# Patient Record
Sex: FEMALE | Race: WHITE | Employment: FULL TIME | URBAN - METROPOLITAN AREA
[De-identification: names, ages, dates, MRNs, and addresses within clinical notes are randomized per-mention and may not be internally consistent; named-entity substitution may affect disease eponyms.]

---

## 2019-06-11 ENCOUNTER — HOSPITAL ENCOUNTER (OUTPATIENT)
Age: 54
Discharge: HOME | End: 2019-06-13
Payer: COMMERCIAL

## 2019-06-11 ENCOUNTER — HOSPITAL ENCOUNTER (OUTPATIENT)
Dept: GENERAL RADIOLOGY | Age: 54
Discharge: HOME | End: 2019-06-13
Payer: COMMERCIAL

## 2019-06-11 DIAGNOSIS — M25.531 PAIN IN RIGHT WRIST: ICD-10-CM

## 2019-06-11 DIAGNOSIS — M25.532 PAIN IN LEFT WRIST: ICD-10-CM

## 2019-06-11 PROCEDURE — 73110 X-RAY EXAM OF WRIST: CPT

## 2020-01-06 ENCOUNTER — HOSPITAL ENCOUNTER (EMERGENCY)
Age: 55
Discharge: HOME | End: 2020-01-06
Attending: EMERGENCY MEDICINE
Payer: COMMERCIAL

## 2020-01-06 ENCOUNTER — APPOINTMENT (OUTPATIENT)
Dept: CT IMAGING | Age: 55
End: 2020-01-06
Payer: COMMERCIAL

## 2020-01-06 VITALS
HEART RATE: 92 BPM | RESPIRATION RATE: 16 BRPM | OXYGEN SATURATION: 98 % | DIASTOLIC BLOOD PRESSURE: 65 MMHG | SYSTOLIC BLOOD PRESSURE: 134 MMHG | TEMPERATURE: 98.3 F

## 2020-01-06 LAB
ALBUMIN SERPL-MCNC: 4.2 G/DL (ref 3.5–5.2)
ALP BLD-CCNC: 190 U/L (ref 35–104)
ALT SERPL-CCNC: 94 U/L (ref 0–32)
ANION GAP SERPL CALCULATED.3IONS-SCNC: 18 MMOL/L (ref 7–16)
AST SERPL-CCNC: 57 U/L (ref 0–31)
BACTERIA: ABNORMAL /HPF
BILIRUB SERPL-MCNC: 0.8 MG/DL (ref 0–1.2)
BILIRUBIN URINE: ABNORMAL
BLOOD, URINE: ABNORMAL
BUN BLDV-MCNC: 12 MG/DL (ref 6–20)
CALCIUM SERPL-MCNC: 10.1 MG/DL (ref 8.6–10.2)
CHLORIDE BLD-SCNC: 96 MMOL/L (ref 98–107)
CLARITY: CLEAR
CO2: 20 MMOL/L (ref 22–29)
COLOR: YELLOW
CREAT SERPL-MCNC: 0.8 MG/DL (ref 0.5–1)
EPITHELIAL CELLS, UA: ABNORMAL /HPF
GFR AFRICAN AMERICAN: >60
GFR NON-AFRICAN AMERICAN: >60 ML/MIN/1.73
GLUCOSE BLD-MCNC: 113 MG/DL (ref 74–99)
GLUCOSE URINE: NEGATIVE MG/DL
HCT VFR BLD CALC: 43.2 % (ref 34–48)
HEMOGLOBIN: 14.3 G/DL (ref 11.5–15.5)
KETONES, URINE: ABNORMAL MG/DL
LACTIC ACID, SEPSIS: 3.2 MMOL/L (ref 0.5–1.9)
LEUKOCYTE ESTERASE, URINE: NEGATIVE
LIPASE: 25 U/L (ref 13–60)
MCH RBC QN AUTO: 28.4 PG (ref 26–35)
MCHC RBC AUTO-ENTMCNC: 33.1 % (ref 32–34.5)
MCV RBC AUTO: 85.9 FL (ref 80–99.9)
NITRITE, URINE: NEGATIVE
PDW BLD-RTO: 12.8 FL (ref 11.5–15)
PH UA: 6 (ref 5–9)
PLATELET # BLD: 221 E9/L (ref 130–450)
PMV BLD AUTO: 10.1 FL (ref 7–12)
POTASSIUM SERPL-SCNC: 3.3 MMOL/L (ref 3.5–5)
PROTEIN UA: 100 MG/DL
RBC # BLD: 5.03 E12/L (ref 3.5–5.5)
RBC UA: ABNORMAL /HPF (ref 0–2)
SODIUM BLD-SCNC: 134 MMOL/L (ref 132–146)
SPECIFIC GRAVITY UA: 1.02 (ref 1–1.03)
TOTAL PROTEIN: 8.2 G/DL (ref 6.4–8.3)
TROPONIN: <0.01 NG/ML (ref 0–0.03)
UROBILINOGEN, URINE: 4 E.U./DL
WBC # BLD: 7.1 E9/L (ref 4.5–11.5)
WBC UA: ABNORMAL /HPF (ref 0–5)

## 2020-01-06 PROCEDURE — 2580000003 HC RX 258: Performed by: EMERGENCY MEDICINE

## 2020-01-06 PROCEDURE — 80053 COMPREHEN METABOLIC PANEL: CPT

## 2020-01-06 PROCEDURE — 96372 THER/PROPH/DIAG INJ SC/IM: CPT

## 2020-01-06 PROCEDURE — 6370000000 HC RX 637 (ALT 250 FOR IP): Performed by: NURSE PRACTITIONER

## 2020-01-06 PROCEDURE — 74176 CT ABD & PELVIS W/O CONTRAST: CPT

## 2020-01-06 PROCEDURE — 93005 ELECTROCARDIOGRAM TRACING: CPT | Performed by: NURSE PRACTITIONER

## 2020-01-06 PROCEDURE — 6360000002 HC RX W HCPCS

## 2020-01-06 PROCEDURE — 83605 ASSAY OF LACTIC ACID: CPT

## 2020-01-06 PROCEDURE — 83690 ASSAY OF LIPASE: CPT

## 2020-01-06 PROCEDURE — 6360000002 HC RX W HCPCS: Performed by: EMERGENCY MEDICINE

## 2020-01-06 PROCEDURE — 85027 COMPLETE CBC AUTOMATED: CPT

## 2020-01-06 PROCEDURE — 99284 EMERGENCY DEPT VISIT MOD MDM: CPT

## 2020-01-06 PROCEDURE — 84484 ASSAY OF TROPONIN QUANT: CPT

## 2020-01-06 PROCEDURE — 81001 URINALYSIS AUTO W/SCOPE: CPT

## 2020-01-06 RX ORDER — ONDANSETRON 8 MG/1
8 TABLET, ORALLY DISINTEGRATING ORAL EVERY 8 HOURS PRN
Qty: 12 TABLET | Refills: 1 | Status: SHIPPED | OUTPATIENT
Start: 2020-01-06 | End: 2020-05-22

## 2020-01-06 RX ORDER — PROMETHAZINE HYDROCHLORIDE 25 MG/ML
25 INJECTION, SOLUTION INTRAMUSCULAR; INTRAVENOUS ONCE
Status: COMPLETED | OUTPATIENT
Start: 2020-01-06 | End: 2020-01-06

## 2020-01-06 RX ORDER — PROMETHAZINE HYDROCHLORIDE 25 MG/ML
INJECTION, SOLUTION INTRAMUSCULAR; INTRAVENOUS
Status: COMPLETED
Start: 2020-01-06 | End: 2020-01-06

## 2020-01-06 RX ORDER — ONDANSETRON 4 MG/1
4 TABLET, ORALLY DISINTEGRATING ORAL ONCE
Status: COMPLETED | OUTPATIENT
Start: 2020-01-06 | End: 2020-01-06

## 2020-01-06 RX ORDER — 0.9 % SODIUM CHLORIDE 0.9 %
1000 INTRAVENOUS SOLUTION INTRAVENOUS ONCE
Status: COMPLETED | OUTPATIENT
Start: 2020-01-06 | End: 2020-01-06

## 2020-01-06 RX ORDER — ONDANSETRON 2 MG/ML
4 INJECTION INTRAMUSCULAR; INTRAVENOUS EVERY 6 HOURS PRN
Status: DISCONTINUED | OUTPATIENT
Start: 2020-01-06 | End: 2020-01-06 | Stop reason: HOSPADM

## 2020-01-06 RX ADMIN — ONDANSETRON 4 MG: 4 TABLET, ORALLY DISINTEGRATING ORAL at 16:37

## 2020-01-06 RX ADMIN — SODIUM CHLORIDE 1000 ML: 9 INJECTION, SOLUTION INTRAVENOUS at 20:15

## 2020-01-06 RX ADMIN — PROMETHAZINE HYDROCHLORIDE 25 MG: 25 INJECTION INTRAMUSCULAR; INTRAVENOUS at 17:00

## 2020-01-06 RX ADMIN — ONDANSETRON 4 MG: 2 INJECTION INTRAMUSCULAR; INTRAVENOUS at 19:20

## 2020-01-06 RX ADMIN — SODIUM CHLORIDE 1000 ML: 9 INJECTION, SOLUTION INTRAVENOUS at 18:48

## 2020-01-06 RX ADMIN — PROMETHAZINE HYDROCHLORIDE 25 MG: 25 INJECTION, SOLUTION INTRAMUSCULAR; INTRAVENOUS at 17:00

## 2020-01-06 SDOH — HEALTH STABILITY: MENTAL HEALTH: HOW OFTEN DO YOU HAVE A DRINK CONTAINING ALCOHOL?: NEVER

## 2020-01-06 ASSESSMENT — PAIN SCALES - GENERAL: PAINLEVEL_OUTOF10: 7

## 2020-01-06 NOTE — ED NOTES
Bed: 09  Expected date:   Expected time:   Means of arrival:   Comments:  triage     Abner Humphries RN  01/06/20 1127

## 2020-01-06 NOTE — ED PROVIDER NOTES
HPI:  1/6/20, Time: 6:33 PM         Veronica Lozano is a 53 y.o. female presenting to the ED for nausea vomiting diarrhea body aches chills, beginning 3 days ago.  had similar symptoms last week however not as severe. She is unable to keep anything down. Chest pain slight cough. no documented fever but has had shaking chills last few days. Review of Systems:   Pertinent positives and negatives are stated within HPI, all other systems reviewed and are negative.          --------------------------------------------- PAST HISTORY ---------------------------------------------  Past Medical History:  has no past medical history on file. Past Surgical History:  has no past surgical history on file. Social History:  reports that she has quit smoking. She smoked 0.50 packs per day. She does not have any smokeless tobacco history on file. She reports that she does not drink alcohol or use drugs. Family History: family history is not on file. The patients home medications have been reviewed.     Allergies: Pcn [penicillins]    -------------------------------------------------- RESULTS -------------------------------------------------  All laboratory and radiology results have been personally reviewed by myself   LABS:  Results for orders placed or performed during the hospital encounter of 01/06/20   CBC   Result Value Ref Range    WBC 7.1 4.5 - 11.5 E9/L    RBC 5.03 3.50 - 5.50 E12/L    Hemoglobin 14.3 11.5 - 15.5 g/dL    Hematocrit 43.2 34.0 - 48.0 %    MCV 85.9 80.0 - 99.9 fL    MCH 28.4 26.0 - 35.0 pg    MCHC 33.1 32.0 - 34.5 %    RDW 12.8 11.5 - 15.0 fL    Platelets 742 191 - 163 E9/L    MPV 10.1 7.0 - 12.0 fL   Comprehensive Metabolic Panel   Result Value Ref Range    Sodium 134 132 - 146 mmol/L    Potassium 3.3 (L) 3.5 - 5.0 mmol/L    Chloride 96 (L) 98 - 107 mmol/L    CO2 20 (L) 22 - 29 mmol/L    Anion Gap 18 (H) 7 - 16 mmol/L    Glucose 113 (H) 74 - 99 mg/dL    BUN 12 6 - 20 mg/dL Lungs clear to auscultation bilaterally, no wheezes, rales, or rhonchi. Not in respiratory distress  Cardiovascular:  Regular rate and rhythm, no murmurs, gallops, or rubs. Abdomen: Soft, non tender, non distended,   Extremities: Moves all extremities x 4. Warm and well perfused  Skin: warm and dry without rash  Neurologic: GCS 15, nonfocal  Psych: Normal Affect      ------------------------------ ED COURSE/MEDICAL DECISION MAKING----------------------  Medications   0.9 % sodium chloride bolus (1,000 mLs Intravenous New Bag 1/6/20 2015)   ondansetron (ZOFRAN) injection 4 mg (4 mg Intravenous Given 1/6/20 1920)   ondansetron (ZOFRAN-ODT) disintegrating tablet 4 mg (4 mg Oral Given 1/6/20 1637)   promethazine (PHENERGAN) injection 25 mg (25 mg Intramuscular Given 1/6/20 1700)   0.9 % sodium chloride bolus (0 mLs Intravenous Stopped 1/6/20 2015)         ED COURSE:       Medical Decision Making:    Nausea vomiting unable to tolerate p.o. appears dehydrated and is soft and this is likely a viral syndrome will give IV fluids and reassess    Reassessment: Tolerating p.o. soft nontender send home with Zofran discussed indications to return    Counseling: The emergency provider has spoken with the patient and discussed todays results, in addition to providing specific details for the plan of care and counseling regarding the diagnosis and prognosis. Questions are answered at this time and they are agreeable with the plan.      --------------------------------- IMPRESSION AND DISPOSITION ---------------------------------    IMPRESSION  1. Gastroenteritis    2. Viral syndrome        DISPOSITION  Disposition: Discharge to home  Patient condition is stable      NOTE: This report was transcribed using voice recognition software.  Every effort was made to ensure accuracy; however, inadvertent computerized transcription errors may be present      Adolfo Dupont MD  01/06/20 0140

## 2020-01-07 LAB
EKG ATRIAL RATE: 84 BPM
EKG P AXIS: 71 DEGREES
EKG P-R INTERVAL: 156 MS
EKG Q-T INTERVAL: 344 MS
EKG QRS DURATION: 90 MS
EKG QTC CALCULATION (BAZETT): 406 MS
EKG R AXIS: 36 DEGREES
EKG T AXIS: 82 DEGREES
EKG VENTRICULAR RATE: 84 BPM

## 2020-01-07 PROCEDURE — 93010 ELECTROCARDIOGRAM REPORT: CPT | Performed by: INTERNAL MEDICINE

## 2020-01-13 ENCOUNTER — ANESTHESIA EVENT (OUTPATIENT)
Dept: OPERATING ROOM | Age: 55
DRG: 377 | End: 2020-01-13
Payer: COMMERCIAL

## 2020-01-13 ENCOUNTER — APPOINTMENT (OUTPATIENT)
Dept: CT IMAGING | Age: 55
DRG: 377 | End: 2020-01-13
Payer: COMMERCIAL

## 2020-01-13 ENCOUNTER — ANESTHESIA (OUTPATIENT)
Dept: OPERATING ROOM | Age: 55
DRG: 377 | End: 2020-01-13
Payer: COMMERCIAL

## 2020-01-13 ENCOUNTER — APPOINTMENT (OUTPATIENT)
Dept: ULTRASOUND IMAGING | Age: 55
DRG: 377 | End: 2020-01-13
Payer: COMMERCIAL

## 2020-01-13 ENCOUNTER — HOSPITAL ENCOUNTER (INPATIENT)
Age: 55
LOS: 4 days | Discharge: HOME | DRG: 377 | End: 2020-01-17
Attending: EMERGENCY MEDICINE | Admitting: SURGERY
Payer: COMMERCIAL

## 2020-01-13 ENCOUNTER — APPOINTMENT (OUTPATIENT)
Dept: GENERAL RADIOLOGY | Age: 55
DRG: 377 | End: 2020-01-13
Payer: COMMERCIAL

## 2020-01-13 VITALS — OXYGEN SATURATION: 100 % | SYSTOLIC BLOOD PRESSURE: 129 MMHG | DIASTOLIC BLOOD PRESSURE: 64 MMHG

## 2020-01-13 PROBLEM — K92.0 HEMATEMESIS: Status: ACTIVE | Noted: 2020-01-13

## 2020-01-13 LAB
ABO/RH: NORMAL
ACETAMINOPHEN LEVEL: <5 MCG/ML (ref 10–30)
ALBUMIN SERPL-MCNC: 2.6 G/DL (ref 3.5–5.2)
ALP BLD-CCNC: 617 U/L (ref 35–104)
ALT SERPL-CCNC: 269 U/L (ref 0–32)
AMMONIA: 28 UMOL/L (ref 11–51)
ANION GAP SERPL CALCULATED.3IONS-SCNC: 15 MMOL/L (ref 7–16)
ANISOCYTOSIS: ABNORMAL
ANTIBODY SCREEN: NORMAL
APTT: 27.6 SEC (ref 24.5–35.1)
AST SERPL-CCNC: 259 U/L (ref 0–31)
BASOPHILS ABSOLUTE: 0.04 E9/L (ref 0–0.2)
BASOPHILS RELATIVE PERCENT: 0.6 % (ref 0–2)
BILIRUB SERPL-MCNC: 3.9 MG/DL (ref 0–1.2)
BUN BLDV-MCNC: 22 MG/DL (ref 6–20)
CALCIUM SERPL-MCNC: 8.5 MG/DL (ref 8.6–10.2)
CHLORIDE BLD-SCNC: 102 MMOL/L (ref 98–107)
CHP ED QC CHECK: NORMAL
CO2: 21 MMOL/L (ref 22–29)
CREAT SERPL-MCNC: 0.7 MG/DL (ref 0.5–1)
EOSINOPHILS ABSOLUTE: 0.01 E9/L (ref 0.05–0.5)
EOSINOPHILS RELATIVE PERCENT: 0.1 % (ref 0–6)
ETHANOL: <10 MG/DL (ref 0–0.08)
GFR AFRICAN AMERICAN: >60
GFR NON-AFRICAN AMERICAN: >60 ML/MIN/1.73
GLUCOSE BLD-MCNC: 112 MG/DL (ref 74–99)
GLUCOSE BLD-MCNC: 118 MG/DL
HCT VFR BLD CALC: 29 % (ref 34–48)
HEMOGLOBIN: 9.5 G/DL (ref 11.5–15.5)
IMMATURE GRANULOCYTES #: 0.31 E9/L
IMMATURE GRANULOCYTES %: 4.3 % (ref 0–5)
INR BLD: 1.2
LACTIC ACID: 3.8 MMOL/L (ref 0.5–2.2)
LYMPHOCYTES ABSOLUTE: 2.55 E9/L (ref 1.5–4)
LYMPHOCYTES RELATIVE PERCENT: 35.2 % (ref 20–42)
MCH RBC QN AUTO: 28.8 PG (ref 26–35)
MCHC RBC AUTO-ENTMCNC: 32.8 % (ref 32–34.5)
MCV RBC AUTO: 87.9 FL (ref 80–99.9)
METER GLUCOSE: 118 MG/DL (ref 74–99)
MONOCYTES ABSOLUTE: 0.36 E9/L (ref 0.1–0.95)
MONOCYTES RELATIVE PERCENT: 5 % (ref 2–12)
NEUTROPHILS ABSOLUTE: 3.97 E9/L (ref 1.8–7.3)
NEUTROPHILS RELATIVE PERCENT: 54.8 % (ref 43–80)
PDW BLD-RTO: 13.9 FL (ref 11.5–15)
PLATELET # BLD: 224 E9/L (ref 130–450)
PMV BLD AUTO: 10.3 FL (ref 7–12)
POTASSIUM SERPL-SCNC: 4.8 MMOL/L (ref 3.5–5)
PROTHROMBIN TIME: 13.6 SEC (ref 9.3–12.4)
RBC # BLD: 3.3 E12/L (ref 3.5–5.5)
SALICYLATE, SERUM: <0.3 MG/DL (ref 0–30)
SODIUM BLD-SCNC: 138 MMOL/L (ref 132–146)
TOTAL PROTEIN: 5.9 G/DL (ref 6.4–8.3)
TRICYCLIC ANTIDEPRESSANTS SCREEN SERUM: NEGATIVE NG/ML
TROPONIN: <0.01 NG/ML (ref 0–0.03)
WBC # BLD: 7.2 E9/L (ref 4.5–11.5)

## 2020-01-13 PROCEDURE — 85730 THROMBOPLASTIN TIME PARTIAL: CPT

## 2020-01-13 PROCEDURE — 86900 BLOOD TYPING SEROLOGIC ABO: CPT

## 2020-01-13 PROCEDURE — 2580000003 HC RX 258: Performed by: EMERGENCY MEDICINE

## 2020-01-13 PROCEDURE — 6360000002 HC RX W HCPCS: Performed by: STUDENT IN AN ORGANIZED HEALTH CARE EDUCATION/TRAINING PROGRAM

## 2020-01-13 PROCEDURE — 70450 CT HEAD/BRAIN W/O DYE: CPT

## 2020-01-13 PROCEDURE — 86923 COMPATIBILITY TEST ELECTRIC: CPT

## 2020-01-13 PROCEDURE — 36430 TRANSFUSION BLD/BLD COMPNT: CPT

## 2020-01-13 PROCEDURE — 82140 ASSAY OF AMMONIA: CPT

## 2020-01-13 PROCEDURE — 43255 EGD CONTROL BLEEDING ANY: CPT | Performed by: SURGERY

## 2020-01-13 PROCEDURE — 86850 RBC ANTIBODY SCREEN: CPT

## 2020-01-13 PROCEDURE — 86901 BLOOD TYPING SEROLOGIC RH(D): CPT

## 2020-01-13 PROCEDURE — 3600000011 HC SURGERY LEVEL 1  ADDTL 15MIN: Performed by: SURGERY

## 2020-01-13 PROCEDURE — 85610 PROTHROMBIN TIME: CPT

## 2020-01-13 PROCEDURE — 96372 THER/PROPH/DIAG INJ SC/IM: CPT

## 2020-01-13 PROCEDURE — 83605 ASSAY OF LACTIC ACID: CPT

## 2020-01-13 PROCEDURE — C9113 INJ PANTOPRAZOLE SODIUM, VIA: HCPCS | Performed by: STUDENT IN AN ORGANIZED HEALTH CARE EDUCATION/TRAINING PROGRAM

## 2020-01-13 PROCEDURE — 73030 X-RAY EXAM OF SHOULDER: CPT

## 2020-01-13 PROCEDURE — 80074 ACUTE HEPATITIS PANEL: CPT

## 2020-01-13 PROCEDURE — 87040 BLOOD CULTURE FOR BACTERIA: CPT

## 2020-01-13 PROCEDURE — 71045 X-RAY EXAM CHEST 1 VIEW: CPT

## 2020-01-13 PROCEDURE — 99291 CRITICAL CARE FIRST HOUR: CPT | Performed by: SURGERY

## 2020-01-13 PROCEDURE — 87081 CULTURE SCREEN ONLY: CPT

## 2020-01-13 PROCEDURE — 3600000001 HC SURGERY LEVEL 1  BASE: Performed by: SURGERY

## 2020-01-13 PROCEDURE — 6360000002 HC RX W HCPCS: Performed by: NURSE ANESTHETIST, CERTIFIED REGISTERED

## 2020-01-13 PROCEDURE — 3700000000 HC ANESTHESIA ATTENDED CARE: Performed by: SURGERY

## 2020-01-13 PROCEDURE — 3700000001 HC ADD 15 MINUTES (ANESTHESIA): Performed by: SURGERY

## 2020-01-13 PROCEDURE — 84484 ASSAY OF TROPONIN QUANT: CPT

## 2020-01-13 PROCEDURE — 2000000000 HC ICU R&B

## 2020-01-13 PROCEDURE — 93005 ELECTROCARDIOGRAM TRACING: CPT | Performed by: EMERGENCY MEDICINE

## 2020-01-13 PROCEDURE — 3E0G8GC INTRODUCTION OF OTHER THERAPEUTIC SUBSTANCE INTO UPPER GI, VIA NATURAL OR ARTIFICIAL OPENING ENDOSCOPIC: ICD-10-PCS | Performed by: SURGERY

## 2020-01-13 PROCEDURE — 82962 GLUCOSE BLOOD TEST: CPT

## 2020-01-13 PROCEDURE — 76705 ECHO EXAM OF ABDOMEN: CPT

## 2020-01-13 PROCEDURE — 2500000003 HC RX 250 WO HCPCS: Performed by: NURSE ANESTHETIST, CERTIFIED REGISTERED

## 2020-01-13 PROCEDURE — 86920 COMPATIBILITY TEST SPIN: CPT

## 2020-01-13 PROCEDURE — 96374 THER/PROPH/DIAG INJ IV PUSH: CPT

## 2020-01-13 PROCEDURE — 0W3P8ZZ CONTROL BLEEDING IN GASTROINTESTINAL TRACT, VIA NATURAL OR ARTIFICIAL OPENING ENDOSCOPIC: ICD-10-PCS | Performed by: SURGERY

## 2020-01-13 PROCEDURE — 85025 COMPLETE CBC W/AUTO DIFF WBC: CPT

## 2020-01-13 PROCEDURE — 74175 CTA ABDOMEN W/CONTRAST: CPT

## 2020-01-13 PROCEDURE — 6360000002 HC RX W HCPCS: Performed by: EMERGENCY MEDICINE

## 2020-01-13 PROCEDURE — 6360000004 HC RX CONTRAST MEDICATION: Performed by: RADIOLOGY

## 2020-01-13 PROCEDURE — C9113 INJ PANTOPRAZOLE SODIUM, VIA: HCPCS | Performed by: EMERGENCY MEDICINE

## 2020-01-13 PROCEDURE — G0480 DRUG TEST DEF 1-7 CLASSES: HCPCS

## 2020-01-13 PROCEDURE — P9016 RBC LEUKOCYTES REDUCED: HCPCS

## 2020-01-13 PROCEDURE — 99291 CRITICAL CARE FIRST HOUR: CPT

## 2020-01-13 PROCEDURE — 80053 COMPREHEN METABOLIC PANEL: CPT

## 2020-01-13 PROCEDURE — 36415 COLL VENOUS BLD VENIPUNCTURE: CPT

## 2020-01-13 PROCEDURE — 2709999900 HC NON-CHARGEABLE SUPPLY: Performed by: SURGERY

## 2020-01-13 PROCEDURE — 2720000010 HC SURG SUPPLY STERILE: Performed by: SURGERY

## 2020-01-13 PROCEDURE — 2580000003 HC RX 258: Performed by: STUDENT IN AN ORGANIZED HEALTH CARE EDUCATION/TRAINING PROGRAM

## 2020-01-13 PROCEDURE — 80307 DRUG TEST PRSMV CHEM ANLYZR: CPT

## 2020-01-13 RX ORDER — 0.9 % SODIUM CHLORIDE 0.9 %
250 INTRAVENOUS SOLUTION INTRAVENOUS ONCE
Status: DISCONTINUED | OUTPATIENT
Start: 2020-01-13 | End: 2020-01-13

## 2020-01-13 RX ORDER — ONDANSETRON 2 MG/ML
4 INJECTION INTRAMUSCULAR; INTRAVENOUS EVERY 6 HOURS PRN
Status: DISCONTINUED | OUTPATIENT
Start: 2020-01-13 | End: 2020-01-17 | Stop reason: HOSPADM

## 2020-01-13 RX ORDER — SUCCINYLCHOLINE/SOD CL,ISO/PF 200MG/10ML
SYRINGE (ML) INTRAVENOUS PRN
Status: DISCONTINUED | OUTPATIENT
Start: 2020-01-13 | End: 2020-01-13

## 2020-01-13 RX ORDER — 0.9 % SODIUM CHLORIDE 0.9 %
1000 INTRAVENOUS SOLUTION INTRAVENOUS ONCE
Status: DISCONTINUED | OUTPATIENT
Start: 2020-01-13 | End: 2020-01-15

## 2020-01-13 RX ORDER — PHENYLEPHRINE HYDROCHLORIDE 10 MG/ML
INJECTION INTRAVENOUS PRN
Status: DISCONTINUED | OUTPATIENT
Start: 2020-01-13 | End: 2020-01-13 | Stop reason: SDUPTHER

## 2020-01-13 RX ORDER — SODIUM CHLORIDE, SODIUM LACTATE, POTASSIUM CHLORIDE, CALCIUM CHLORIDE 600; 310; 30; 20 MG/100ML; MG/100ML; MG/100ML; MG/100ML
INJECTION, SOLUTION INTRAVENOUS CONTINUOUS
Status: DISCONTINUED | OUTPATIENT
Start: 2020-01-14 | End: 2020-01-15

## 2020-01-13 RX ORDER — PANTOPRAZOLE SODIUM 40 MG/10ML
80 INJECTION, POWDER, LYOPHILIZED, FOR SOLUTION INTRAVENOUS ONCE
Status: COMPLETED | OUTPATIENT
Start: 2020-01-13 | End: 2020-01-13

## 2020-01-13 RX ORDER — ONDANSETRON 2 MG/ML
INJECTION INTRAMUSCULAR; INTRAVENOUS PRN
Status: DISCONTINUED | OUTPATIENT
Start: 2020-01-13 | End: 2020-01-13 | Stop reason: SDUPTHER

## 2020-01-13 RX ORDER — PROPOFOL 10 MG/ML
INJECTION, EMULSION INTRAVENOUS PRN
Status: DISCONTINUED | OUTPATIENT
Start: 2020-01-13 | End: 2020-01-13 | Stop reason: SDUPTHER

## 2020-01-13 RX ORDER — SODIUM CHLORIDE 9 MG/ML
INJECTION, SOLUTION INTRAVENOUS CONTINUOUS
Status: DISCONTINUED | OUTPATIENT
Start: 2020-01-13 | End: 2020-01-15

## 2020-01-13 RX ORDER — SUCCINYLCHOLINE/SOD CL,ISO/PF 200MG/10ML
SYRINGE (ML) INTRAVENOUS PRN
Status: DISCONTINUED | OUTPATIENT
Start: 2020-01-13 | End: 2020-01-13 | Stop reason: SDUPTHER

## 2020-01-13 RX ORDER — DEXAMETHASONE SODIUM PHOSPHATE 10 MG/ML
INJECTION INTRAMUSCULAR; INTRAVENOUS PRN
Status: DISCONTINUED | OUTPATIENT
Start: 2020-01-13 | End: 2020-01-13 | Stop reason: SDUPTHER

## 2020-01-13 RX ORDER — LIDOCAINE HYDROCHLORIDE 20 MG/ML
INJECTION, SOLUTION INTRAVENOUS PRN
Status: DISCONTINUED | OUTPATIENT
Start: 2020-01-13 | End: 2020-01-13 | Stop reason: SDUPTHER

## 2020-01-13 RX ORDER — SODIUM CHLORIDE 0.9 % (FLUSH) 0.9 %
10 SYRINGE (ML) INJECTION PRN
Status: DISCONTINUED | OUTPATIENT
Start: 2020-01-13 | End: 2020-01-17 | Stop reason: HOSPADM

## 2020-01-13 RX ORDER — FENTANYL CITRATE 50 UG/ML
INJECTION, SOLUTION INTRAMUSCULAR; INTRAVENOUS PRN
Status: DISCONTINUED | OUTPATIENT
Start: 2020-01-13 | End: 2020-01-13 | Stop reason: SDUPTHER

## 2020-01-13 RX ORDER — 0.9 % SODIUM CHLORIDE 0.9 %
1000 INTRAVENOUS SOLUTION INTRAVENOUS ONCE
Status: COMPLETED | OUTPATIENT
Start: 2020-01-13 | End: 2020-01-14

## 2020-01-13 RX ORDER — MIDAZOLAM HYDROCHLORIDE 1 MG/ML
INJECTION INTRAMUSCULAR; INTRAVENOUS PRN
Status: DISCONTINUED | OUTPATIENT
Start: 2020-01-13 | End: 2020-01-13 | Stop reason: SDUPTHER

## 2020-01-13 RX ORDER — SODIUM CHLORIDE 0.9 % (FLUSH) 0.9 %
10 SYRINGE (ML) INJECTION EVERY 12 HOURS SCHEDULED
Status: DISCONTINUED | OUTPATIENT
Start: 2020-01-14 | End: 2020-01-17 | Stop reason: HOSPADM

## 2020-01-13 RX ORDER — MORPHINE SULFATE 2 MG/ML
2 INJECTION, SOLUTION INTRAMUSCULAR; INTRAVENOUS
Status: DISCONTINUED | OUTPATIENT
Start: 2020-01-13 | End: 2020-01-15

## 2020-01-13 RX ADMIN — SODIUM CHLORIDE: 9 INJECTION, SOLUTION INTRAVENOUS at 22:30

## 2020-01-13 RX ADMIN — LIDOCAINE HYDROCHLORIDE 100 MG: 20 INJECTION, SOLUTION INTRAVENOUS at 22:40

## 2020-01-13 RX ADMIN — FENTANYL CITRATE 100 MCG: 50 INJECTION, SOLUTION INTRAMUSCULAR; INTRAVENOUS at 22:40

## 2020-01-13 RX ADMIN — PHENYLEPHRINE HYDROCHLORIDE 200 MCG: 10 INJECTION INTRAVENOUS at 22:40

## 2020-01-13 RX ADMIN — SODIUM CHLORIDE: 9 INJECTION, SOLUTION INTRAVENOUS at 23:10

## 2020-01-13 RX ADMIN — MIDAZOLAM 2 MG: 1 INJECTION INTRAMUSCULAR; INTRAVENOUS at 22:37

## 2020-01-13 RX ADMIN — PROPOFOL 130 MG: 10 INJECTION, EMULSION INTRAVENOUS at 22:40

## 2020-01-13 RX ADMIN — DEXAMETHASONE SODIUM PHOSPHATE 10 MG: 10 INJECTION INTRAMUSCULAR; INTRAVENOUS at 22:43

## 2020-01-13 RX ADMIN — SODIUM CHLORIDE 1000 ML: 9 INJECTION, SOLUTION INTRAVENOUS at 19:46

## 2020-01-13 RX ADMIN — PHENYLEPHRINE HYDROCHLORIDE 100 MCG: 10 INJECTION INTRAVENOUS at 22:44

## 2020-01-13 RX ADMIN — PHENYLEPHRINE HYDROCHLORIDE 200 MCG: 10 INJECTION INTRAVENOUS at 22:49

## 2020-01-13 RX ADMIN — PANTOPRAZOLE SODIUM 8 MG/HR: 40 INJECTION, POWDER, FOR SOLUTION INTRAVENOUS at 21:37

## 2020-01-13 RX ADMIN — TRIMETHOBENZAMIDE HYDROCHLORIDE 200 MG: 100 INJECTION INTRAMUSCULAR at 20:14

## 2020-01-13 RX ADMIN — IOPAMIDOL 100 ML: 755 INJECTION, SOLUTION INTRAVENOUS at 20:26

## 2020-01-13 RX ADMIN — ONDANSETRON HYDROCHLORIDE 4 MG: 2 INJECTION, SOLUTION INTRAMUSCULAR; INTRAVENOUS at 23:07

## 2020-01-13 RX ADMIN — Medication 100 MG: at 22:40

## 2020-01-13 RX ADMIN — PHENYLEPHRINE HYDROCHLORIDE 200 MCG: 10 INJECTION INTRAVENOUS at 22:54

## 2020-01-13 RX ADMIN — PANTOPRAZOLE SODIUM 80 MG: 40 INJECTION, POWDER, FOR SOLUTION INTRAVENOUS at 19:31

## 2020-01-13 ASSESSMENT — PULMONARY FUNCTION TESTS
PIF_VALUE: 1
PIF_VALUE: 28
PIF_VALUE: 26
PIF_VALUE: 2
PIF_VALUE: 30
PIF_VALUE: 13
PIF_VALUE: 36
PIF_VALUE: 0
PIF_VALUE: 4
PIF_VALUE: 1
PIF_VALUE: 23
PIF_VALUE: 23
PIF_VALUE: 19
PIF_VALUE: 1
PIF_VALUE: 33
PIF_VALUE: 26
PIF_VALUE: 23
PIF_VALUE: 26
PIF_VALUE: 32
PIF_VALUE: 25
PIF_VALUE: 32
PIF_VALUE: 28
PIF_VALUE: 25
PIF_VALUE: 27
PIF_VALUE: 18
PIF_VALUE: 11
PIF_VALUE: 1
PIF_VALUE: 1
PIF_VALUE: 4
PIF_VALUE: 0
PIF_VALUE: 0
PIF_VALUE: 27
PIF_VALUE: 21
PIF_VALUE: 25
PIF_VALUE: 18
PIF_VALUE: 28
PIF_VALUE: 25
PIF_VALUE: 25
PIF_VALUE: 26
PIF_VALUE: 27

## 2020-01-13 ASSESSMENT — PAIN SCALES - GENERAL: PAINLEVEL_OUTOF10: 8

## 2020-01-13 ASSESSMENT — PAIN DESCRIPTION - PAIN TYPE: TYPE: ACUTE PAIN

## 2020-01-13 ASSESSMENT — PAIN DESCRIPTION - LOCATION: LOCATION: ABDOMEN

## 2020-01-13 ASSESSMENT — LIFESTYLE VARIABLES: SMOKING_STATUS: 0

## 2020-01-13 NOTE — ED PROVIDER NOTES
uvular edema  Neck: Supple, full ROM, no stridor, no meningeal signs  Respiratory: Lungs clear to auscultation bilaterally, no wheezes, rales, or rhonchi. Not in respiratory distress  Cardiovascular:  Regular rate. Regular rhythm. 2+ distal pulses. Equal extremity pulses. Chest: No chest wall tenderness  GI:  Abdomen Soft, Non tender, Non distended. No rebound, guarding, or rigidity. No pulsatile masses. Musculoskeletal: Moves all extremities x 4. Warm and well perfused, no clubbing, cyanosis, or edema. Capillary refill <3 seconds  Integument: skin warm and dry. No rashes. Neurologic: GCS 15, no focal deficits, symmetric strength 5/5 in the upper and lower extremities bilaterally  Psychiatric: Normal Affect          -------------------------------------------------- RESULTS -------------------------------------------------  I have personally reviewed all laboratory and imaging results for this patient. Results are listed below.      LABS: (Lab results interpreted by me)  Results for orders placed or performed during the hospital encounter of 01/13/20   Comprehensive Metabolic Panel   Result Value Ref Range    Sodium 138 132 - 146 mmol/L    Potassium 4.8 3.5 - 5.0 mmol/L    Chloride 102 98 - 107 mmol/L    CO2 21 (L) 22 - 29 mmol/L    Anion Gap 15 7 - 16 mmol/L    Glucose 112 (H) 74 - 99 mg/dL    BUN 22 (H) 6 - 20 mg/dL    CREATININE 0.7 0.5 - 1.0 mg/dL    GFR Non-African American >60 >=60 mL/min/1.73    GFR African American >60     Calcium 8.5 (L) 8.6 - 10.2 mg/dL    Total Protein 5.9 (L) 6.4 - 8.3 g/dL    Alb 2.6 (L) 3.5 - 5.2 g/dL    Total Bilirubin 3.9 (H) 0.0 - 1.2 mg/dL    Alkaline Phosphatase 617 (H) 35 - 104 U/L     (H) 0 - 32 U/L     (H) 0 - 31 U/L   CBC Auto Differential   Result Value Ref Range    WBC 7.2 4.5 - 11.5 E9/L    RBC 3.30 (L) 3.50 - 5.50 E12/L    Hemoglobin 9.5 (L) 11.5 - 15.5 g/dL    Hematocrit 29.0 (L) 34.0 - 48.0 %    MCV 87.9 80.0 - 99.9 fL    MCH 28.8 26.0 - 35.0 pg Radiologist unless otherwise specified  XR SHOULDER LEFT (MIN 2 VIEWS)   Final Result      No evidence of fracture or dislocation of the shoulder. CT Head WO Contrast   Final Result      No evidence for acute intracranial process. CT of the brain is grossly unremarkable. CTA TRIPHASIC LIVER   Final Result      Splenomegaly with a 2.5 x 1.1 cm hypoenhancing mass within the   periphery of the spleen which might represent a prior laceration or   mass lesion. Punctate calyceal nonobstructing calcifications within the kidneys. XR CHEST PORTABLE   Final Result      No airspace opacities or pleural effusion. XR SHOULDER LEFT (MIN 2 VIEWS)    (Results Pending)         EKG Interpretation  Interpreted by emergency department physician, Dr. Elizabeth Levine   Sinus rhythm, rate 104, no STEMI        ------------------------- NURSING NOTES AND VITALS REVIEWED ---------------------------   The nursing notes within the ED encounter and vital signs as below have been reviewed by myself  BP 97/63   Pulse 104   Temp 97.9 °F (36.6 °C) (Oral)   Resp 21   Ht 5' 2\" (1.575 m)   Wt 130 lb (59 kg)   SpO2 100%   BMI 23.78 kg/m²     Oxygen Saturation Interpretation: Normal    The patients available past medical records and past encounters were reviewed.         ------------------------------ ED COURSE/MEDICAL DECISION MAKING----------------------  Medications   0.9 % sodium chloride infusion (has no administration in time range)   0.9 % sodium chloride bolus (has no administration in time range)   0.9 % sodium chloride bolus (has no administration in time range)   0.9 % sodium chloride bolus (has no administration in time range)   pantoprazole (PROTONIX) 80 mg in sodium chloride 0.9 % 100 mL infusion (8 mg/hr Intravenous New Bag 1/13/20 3237)   0.9 % sodium chloride bolus (1,000 mLs Intravenous New Bag 1/13/20 8756)   trimethobenzamide (TIGAN) injection 200 mg (200 mg

## 2020-01-13 NOTE — ED NOTES
Bed: 23  Expected date:   Expected time:   Means of arrival:   Comments:  ems     Eli Quarles RN  01/13/20 9607

## 2020-01-14 ENCOUNTER — APPOINTMENT (OUTPATIENT)
Dept: GENERAL RADIOLOGY | Age: 55
DRG: 377 | End: 2020-01-14
Payer: COMMERCIAL

## 2020-01-14 LAB
ALBUMIN SERPL-MCNC: 2.4 G/DL (ref 3.5–5.2)
ALP BLD-CCNC: 490 U/L (ref 35–104)
ALT SERPL-CCNC: 218 U/L (ref 0–32)
ANION GAP SERPL CALCULATED.3IONS-SCNC: 14 MMOL/L (ref 7–16)
AST SERPL-CCNC: 191 U/L (ref 0–31)
BILIRUB SERPL-MCNC: 2.5 MG/DL (ref 0–1.2)
BILIRUBIN DIRECT: 2 MG/DL (ref 0–0.3)
BUN BLDV-MCNC: 28 MG/DL (ref 6–20)
CALCIUM IONIZED: 1.03 MMOL/L (ref 1.15–1.33)
CALCIUM SERPL-MCNC: 7.5 MG/DL (ref 8.6–10.2)
CHLORIDE BLD-SCNC: 104 MMOL/L (ref 98–107)
CO2: 20 MMOL/L (ref 22–29)
CREAT SERPL-MCNC: 0.7 MG/DL (ref 0.5–1)
EKG ATRIAL RATE: 104 BPM
EKG P AXIS: 64 DEGREES
EKG P-R INTERVAL: 138 MS
EKG Q-T INTERVAL: 350 MS
EKG QRS DURATION: 88 MS
EKG QTC CALCULATION (BAZETT): 460 MS
EKG R AXIS: 55 DEGREES
EKG T AXIS: 2 DEGREES
EKG VENTRICULAR RATE: 104 BPM
GFR AFRICAN AMERICAN: >60
GFR NON-AFRICAN AMERICAN: >60 ML/MIN/1.73
GLUCOSE BLD-MCNC: 136 MG/DL (ref 74–99)
HAV IGM SER IA-ACNC: NORMAL
HCT VFR BLD CALC: 18.2 % (ref 34–48)
HCT VFR BLD CALC: 20.7 % (ref 34–48)
HCT VFR BLD CALC: 22.2 % (ref 34–48)
HEMOGLOBIN: 6.2 G/DL (ref 11.5–15.5)
HEMOGLOBIN: 6.9 G/DL (ref 11.5–15.5)
HEMOGLOBIN: 7.5 G/DL (ref 11.5–15.5)
HEPATITIS B CORE IGM ANTIBODY: NORMAL
HEPATITIS B SURFACE ANTIGEN INTERPRETATION: NORMAL
HEPATITIS C ANTIBODY INTERPRETATION: NORMAL
LACTIC ACID: 1.3 MMOL/L (ref 0.5–2.2)
LACTIC ACID: 1.9 MMOL/L (ref 0.5–2.2)
MAGNESIUM: 2 MG/DL (ref 1.6–2.6)
MCH RBC QN AUTO: 28.5 PG (ref 26–35)
MCH RBC QN AUTO: 28.8 PG (ref 26–35)
MCH RBC QN AUTO: 28.9 PG (ref 26–35)
MCHC RBC AUTO-ENTMCNC: 33.3 % (ref 32–34.5)
MCHC RBC AUTO-ENTMCNC: 33.8 % (ref 32–34.5)
MCHC RBC AUTO-ENTMCNC: 34.1 % (ref 32–34.5)
MCV RBC AUTO: 84.4 FL (ref 80–99.9)
MCV RBC AUTO: 84.7 FL (ref 80–99.9)
MCV RBC AUTO: 86.6 FL (ref 80–99.9)
PDW BLD-RTO: 13.8 FL (ref 11.5–15)
PDW BLD-RTO: 13.9 FL (ref 11.5–15)
PDW BLD-RTO: 14.1 FL (ref 11.5–15)
PHOSPHORUS: 3.3 MG/DL (ref 2.5–4.5)
PLATELET # BLD: 198 E9/L (ref 130–450)
PLATELET # BLD: 214 E9/L (ref 130–450)
PLATELET # BLD: 232 E9/L (ref 130–450)
PMV BLD AUTO: 10.1 FL (ref 7–12)
PMV BLD AUTO: 10.3 FL (ref 7–12)
PMV BLD AUTO: 10.7 FL (ref 7–12)
POTASSIUM SERPL-SCNC: 3.3 MMOL/L (ref 3.5–5)
RBC # BLD: 2.15 E12/L (ref 3.5–5.5)
RBC # BLD: 2.39 E12/L (ref 3.5–5.5)
RBC # BLD: 2.63 E12/L (ref 3.5–5.5)
REASON FOR REJECTION: NORMAL
REJECTED TEST: NORMAL
SODIUM BLD-SCNC: 138 MMOL/L (ref 132–146)
TOTAL PROTEIN: 4.7 G/DL (ref 6.4–8.3)
WBC # BLD: 7.5 E9/L (ref 4.5–11.5)
WBC # BLD: 8 E9/L (ref 4.5–11.5)
WBC # BLD: 8.1 E9/L (ref 4.5–11.5)

## 2020-01-14 PROCEDURE — 82248 BILIRUBIN DIRECT: CPT

## 2020-01-14 PROCEDURE — 2580000003 HC RX 258: Performed by: STUDENT IN AN ORGANIZED HEALTH CARE EDUCATION/TRAINING PROGRAM

## 2020-01-14 PROCEDURE — 83605 ASSAY OF LACTIC ACID: CPT

## 2020-01-14 PROCEDURE — 99291 CRITICAL CARE FIRST HOUR: CPT | Performed by: SURGERY

## 2020-01-14 PROCEDURE — 36591 DRAW BLOOD OFF VENOUS DEVICE: CPT

## 2020-01-14 PROCEDURE — 6360000002 HC RX W HCPCS: Performed by: STUDENT IN AN ORGANIZED HEALTH CARE EDUCATION/TRAINING PROGRAM

## 2020-01-14 PROCEDURE — 83735 ASSAY OF MAGNESIUM: CPT

## 2020-01-14 PROCEDURE — 84100 ASSAY OF PHOSPHORUS: CPT

## 2020-01-14 PROCEDURE — 82330 ASSAY OF CALCIUM: CPT

## 2020-01-14 PROCEDURE — 36415 COLL VENOUS BLD VENIPUNCTURE: CPT

## 2020-01-14 PROCEDURE — 93010 ELECTROCARDIOGRAM REPORT: CPT | Performed by: INTERNAL MEDICINE

## 2020-01-14 PROCEDURE — 6370000000 HC RX 637 (ALT 250 FOR IP): Performed by: STUDENT IN AN ORGANIZED HEALTH CARE EDUCATION/TRAINING PROGRAM

## 2020-01-14 PROCEDURE — 2000000000 HC ICU R&B

## 2020-01-14 PROCEDURE — 36430 TRANSFUSION BLD/BLD COMPNT: CPT

## 2020-01-14 PROCEDURE — 85027 COMPLETE CBC AUTOMATED: CPT

## 2020-01-14 PROCEDURE — C9113 INJ PANTOPRAZOLE SODIUM, VIA: HCPCS | Performed by: STUDENT IN AN ORGANIZED HEALTH CARE EDUCATION/TRAINING PROGRAM

## 2020-01-14 PROCEDURE — 80053 COMPREHEN METABOLIC PANEL: CPT

## 2020-01-14 PROCEDURE — P9016 RBC LEUKOCYTES REDUCED: HCPCS

## 2020-01-14 RX ORDER — POTASSIUM CHLORIDE 20 MEQ/1
40 TABLET, EXTENDED RELEASE ORAL ONCE
Status: COMPLETED | OUTPATIENT
Start: 2020-01-14 | End: 2020-01-14

## 2020-01-14 RX ORDER — POTASSIUM CHLORIDE 7.45 MG/ML
10 INJECTION INTRAVENOUS ONCE
Status: COMPLETED | OUTPATIENT
Start: 2020-01-14 | End: 2020-01-14

## 2020-01-14 RX ADMIN — PANTOPRAZOLE SODIUM 8 MG/HR: 40 INJECTION, POWDER, FOR SOLUTION INTRAVENOUS at 13:46

## 2020-01-14 RX ADMIN — MORPHINE SULFATE 2 MG: 2 INJECTION, SOLUTION INTRAMUSCULAR; INTRAVENOUS at 00:57

## 2020-01-14 RX ADMIN — TRIMETHOBENZAMIDE HYDROCHLORIDE 200 MG: 100 INJECTION INTRAMUSCULAR at 00:57

## 2020-01-14 RX ADMIN — Medication 10 ML: at 08:08

## 2020-01-14 RX ADMIN — MORPHINE SULFATE 2 MG: 2 INJECTION, SOLUTION INTRAMUSCULAR; INTRAVENOUS at 08:21

## 2020-01-14 RX ADMIN — MORPHINE SULFATE 2 MG: 2 INJECTION, SOLUTION INTRAMUSCULAR; INTRAVENOUS at 17:57

## 2020-01-14 RX ADMIN — POTASSIUM CHLORIDE 10 MEQ: 7.46 INJECTION, SOLUTION INTRAVENOUS at 18:12

## 2020-01-14 RX ADMIN — SODIUM CHLORIDE, POTASSIUM CHLORIDE, SODIUM LACTATE AND CALCIUM CHLORIDE: 600; 310; 30; 20 INJECTION, SOLUTION INTRAVENOUS at 01:04

## 2020-01-14 RX ADMIN — SODIUM CHLORIDE, POTASSIUM CHLORIDE, SODIUM LACTATE AND CALCIUM CHLORIDE: 600; 310; 30; 20 INJECTION, SOLUTION INTRAVENOUS at 11:21

## 2020-01-14 RX ADMIN — SODIUM CHLORIDE, PRESERVATIVE FREE 10 ML: 5 INJECTION INTRAVENOUS at 18:16

## 2020-01-14 RX ADMIN — POTASSIUM CHLORIDE 40 MEQ: 1500 TABLET, EXTENDED RELEASE ORAL at 18:12

## 2020-01-14 ASSESSMENT — PAIN DESCRIPTION - DESCRIPTORS
DESCRIPTORS: DISCOMFORT;PRESSURE
DESCRIPTORS: ACHING;DISCOMFORT;DULL
DESCRIPTORS: ACHING;DISCOMFORT;DULL

## 2020-01-14 ASSESSMENT — PAIN SCALES - GENERAL
PAINLEVEL_OUTOF10: 2
PAINLEVEL_OUTOF10: 3
PAINLEVEL_OUTOF10: 0
PAINLEVEL_OUTOF10: 7
PAINLEVEL_OUTOF10: 0
PAINLEVEL_OUTOF10: 0
PAINLEVEL_OUTOF10: 2
PAINLEVEL_OUTOF10: 0
PAINLEVEL_OUTOF10: 5
PAINLEVEL_OUTOF10: 2
PAINLEVEL_OUTOF10: 2
PAINLEVEL_OUTOF10: 0
PAINLEVEL_OUTOF10: 5
PAINLEVEL_OUTOF10: 5
PAINLEVEL_OUTOF10: 3
PAINLEVEL_OUTOF10: 0
PAINLEVEL_OUTOF10: 0
PAINLEVEL_OUTOF10: 5

## 2020-01-14 ASSESSMENT — PAIN DESCRIPTION - ORIENTATION
ORIENTATION: MID
ORIENTATION: LEFT
ORIENTATION: LEFT

## 2020-01-14 ASSESSMENT — PAIN DESCRIPTION - ONSET: ONSET: ON-GOING

## 2020-01-14 ASSESSMENT — PAIN DESCRIPTION - LOCATION
LOCATION: SHOULDER
LOCATION: SHOULDER
LOCATION: ABDOMEN;SHOULDER

## 2020-01-14 ASSESSMENT — PAIN DESCRIPTION - FREQUENCY: FREQUENCY: INTERMITTENT

## 2020-01-14 ASSESSMENT — PAIN DESCRIPTION - PAIN TYPE
TYPE: ACUTE PAIN
TYPE: ACUTE PAIN
TYPE: ACUTE PAIN;SURGICAL PAIN

## 2020-01-14 NOTE — PROGRESS NOTES
SURGICAL INTENSIVE CARE  PROGRESS NOTE    Date of admission:  1/13/2020  Reason for ICU transfer: GI bleed, acute blood loss anemia    SUBJECTIVE:  Patient is a 25-year-old female presenting with coffee-ground emesis to the ED on 1/13. He has a significant past medical history of GERD. She had a colonoscopy performed 3 years prior and was found to have benign polyps. Patient was diagnosed with GI bleed, anemia, jaundice, liver failure. She had an EGD performed demonstrating a Dieulafoy lesion. 3 endoclips were placed for hemostasis. I/O last 3 completed shifts: In: 9922 [I.V.:2935; Blood:300]  Out: 601 [Urine:600; Stool:1]  No intake/output data recorded. Cta Triphasic Liver     Result Date: 1/13/2020  This examination and all examinations utilizing ionizing radiation at this facility are done so according to the ALARA (as low as reasonably achievable) principal for radiation dose reduction. COMPARISON: January 6, 2020. TECHNIQUE: Axial, sagittal, and coronal computed tomography of the liver was performed pre and post intravenous administration of 100 mL of Isovue-370. 3-D postprocessing. Three-dimensional reconstructions of the arterial tree. Triple phase protocol through the liver. FINDINGS: Dependent atelectasis and scarring. The lung bases are negative for dominant mass or airspace consolidation. The heart is not enlarged. There is no evidence of pericardial fluid. A small sliding hiatal hernia is present within the abdomen, spleen is slightly enlarged measuring 13.5 cm in length. A focal splenic lesion which is hypoattenuating on the enhanced portion of the exam measures 2.5 x 1.1 cm. This might reflect a prior laceration or a mass lesion. The gallbladder, hepatobiliary tree, pancreas and adrenal glands are unremarkable. Punctate calyceal calcifications are identified in the bilateral kidneys. No evidence of hydronephrosis. The kidneys are negative for evidence of solid mass or hydronephrosis. There are tiny renal cysts. There is no evidence of free fluid, free air, or gastrointestinal obstruction. There is no evidence for mesenteric inflammation or lymphadenopathy. The aorta and its branches enhance appropriately.      Splenomegaly with a 2.5 x 1.1 cm hypoenhancing mass within the periphery of the spleen which might represent a prior laceration or mass lesion. Punctate calyceal nonobstructing calcifications within the kidneys.        HOSPITAL COURSE:  1/13-present to the ER with hemoptysis with loss of consciousness. EGD performed demonstrating Dieulafoy lesion, clips placed for hemostasis. 1/14--hemoglobin 6.2 today, will transfuse 1 unit      PHYSICAL EXAM:  BP (!) 130/57   Pulse 109   Temp 98.2 °F (36.8 °C)   Resp 22   Ht 5' 2\" (1.575 m)   Wt 154 lb (69.9 kg)   SpO2 97%   BMI 28.17 kg/m²     Physical Exam  Constitutional:       Appearance: Normal appearance. HENT:      Head: Normocephalic and atraumatic. Eyes:      Comments: Conjunctive pale bilateral   Cardiovascular:      Rate and Rhythm: Regular rhythm. Tachycardia present. Pulmonary:      Effort: Pulmonary effort is normal.   Abdominal:      Tenderness: There is generalized tenderness and tenderness in the right upper quadrant. There is guarding. There is no rebound. Skin:     General: Skin is warm and dry. Neurological:      Mental Status: She is alert. ASSESSMENT / PLAN:  Neuro:    Syncope  -CT of the head and chest x-ray with no acute intracranial process. Syncope likely secondary to acute blood loss. Pain  -Morphine 2 mg as needed 3 hours    CV:   Hemorrhagic shock secondary to upper GI bleed  -Serial lactic  -Continue monitoring of hemodynamics. Pulm:   No acute issues. Renal:   Hypokalemia  -Replete  -Monitor UOP & Electrolytes.   -Continue LR at 100 mL/h  -CMP     GI:   Dieulafoy lesion with active hemorrhage  -clipped lesion with EGD  -Transfuse red blood cells when hemoglobin less than 7  -Bowel

## 2020-01-14 NOTE — H&P
radiation dose reduction. COMPARISON: None. TECHNIQUE: Axial, sagittal, and coronal computed tomography of the brain and calvarium was performed without contrast. FINDINGS: There is no evidence for acute intracranial hemorrhage, midline shift or mass effect. The brain parenchyma, CSF spaces, paranasal sinuses and mastoid air cells and surrounding soft tissue and osseous structures have a satisfactory appearance. The gray-white matter junction is preserved. The cerebellopontine angle and internal auditory canals have a normal appearance. The sella turcica and pituitary gland are unremarkable. No evidence for acute intracranial process. CT of the brain is grossly unremarkable. Xr Shoulder Left (min 2 Views)    Result Date: 2020  Patient MRN:  [id number] : 1965 Age: 53 years Gender: Female Order Date:  2020 7:00 PM EXAM: XR SHOULDER LEFT (MIN 2 VIEWS) COMPARISON: None INDICATION: Pain VIEWS: Four views of the shoulder FINDINGS: No acute fracture or dislocation. No acromioclavicular joint separation identified. No evidence of fracture or dislocation of the shoulder. Xr Chest Portable    Result Date: 2020  Patient MRN:  [id number] : 1965 Age: 53 years Gender: Female Order Date:  2020 6:45 PM EXAM: XR CHEST PORTABLE COMPARISON: None INDICATION:  Possible sepsis Possible sepsis FINDINGS: The heart is normal in size. No focal airspace opacity. There is no pleural effusion. There is no pneumothorax. No free air beneath the diaphragms. No airspace opacities or pleural effusion. Cta Triphasic Liver    Result Date: 2020  This examination and all examinations utilizing ionizing radiation at this facility are done so according to the ALARA (as low as reasonably achievable) principal for radiation dose reduction. COMPARISON: 2020.  TECHNIQUE: Axial, sagittal, and coronal computed tomography of the liver was performed pre and post intravenous administration of 100 mL of Isovue-370. 3-D postprocessing. Three-dimensional reconstructions of the arterial tree. Triple phase protocol through the liver. FINDINGS: Dependent atelectasis and scarring. The lung bases are negative for dominant mass or airspace consolidation. The heart is not enlarged. There is no evidence of pericardial fluid. A small sliding hiatal hernia is present within the abdomen, spleen is slightly enlarged measuring 13.5 cm in length. A focal splenic lesion which is hypoattenuating on the enhanced portion of the exam measures 2.5 x 1.1 cm. This might reflect a prior laceration or a mass lesion. The gallbladder, hepatobiliary tree, pancreas and adrenal glands are unremarkable. Punctate calyceal calcifications are identified in the bilateral kidneys. No evidence of hydronephrosis. The kidneys are negative for evidence of solid mass or hydronephrosis. There are tiny renal cysts. There is no evidence of free fluid, free air, or gastrointestinal obstruction. There is no evidence for mesenteric inflammation or lymphadenopathy. The aorta and its branches enhance appropriately. Splenomegaly with a 2.5 x 1.1 cm hypoenhancing mass within the periphery of the spleen which might represent a prior laceration or mass lesion. Punctate calyceal nonobstructing calcifications within the kidneys. ASSESSMENT/PLAN:  53 y.o. female with hematemesis, no significant past medical history    - CTA triphasic liver  - Admit to ICU  - Will likely need EGD in OR  - TEG, INR  - Transfusing 1 uPRBC in ED, continue resuscitation    Plan discussed with Dr. Jaymie Castillo.     Earline Echevarria MD  Resident, PGY-1  1/13/2020  9:36 PM

## 2020-01-14 NOTE — ANESTHESIA PRE PROCEDURE
Department of Anesthesiology  Preprocedure Note       Name:  Ray Quigley   Age:  53 y.o.  :  1965                                          MRN:  [id number]         Date:  2020      Surgeon: Fartun Bueno):  Trang Finney MD    Procedure: EGD ESOPHAGOGASTRODUODENOSCOPY (N/A )    Medications prior to admission:   Prior to Admission medications    Medication Sig Start Date End Date Taking? Authorizing Provider   ondansetron (ZOFRAN ODT) 8 MG TBDP disintegrating tablet Take 1 tablet by mouth every 8 hours as needed for Nausea 20   Jocy Ramirez MD   brompheniramine-pseudoephedrine-DM 30-2-10 MG/5ML syrup Take 5 mLs by mouth 4 times daily as needed. 1/23/15   Mile Call MD       Current medications:    Current Facility-Administered Medications   Medication Dose Route Frequency Provider Last Rate Last Dose    0.9 % sodium chloride bolus  1,000 mL Intravenous Once Marisa Spencer  mL/hr at 20 1946 1,000 mL at 20 194    0.9 % sodium chloride infusion   Intravenous Continuous Marisa Spencer MD        0.9 % sodium chloride bolus  250 mL Intravenous Once Marisa Spencer MD        0.9 % sodium chloride bolus  1,000 mL Intravenous Once Leonarda Tabor MD        0.9 % sodium chloride bolus  250 mL Intravenous Once Marisa Spencer MD        pantoprazole (PROTONIX) 80 mg in sodium chloride 0.9 % 100 mL infusion  8 mg/hr Intravenous Continuous Leonarda Tabor MD         Current Outpatient Medications   Medication Sig Dispense Refill    ondansetron (ZOFRAN ODT) 8 MG TBDP disintegrating tablet Take 1 tablet by mouth every 8 hours as needed for Nausea 12 tablet 1    brompheniramine-pseudoephedrine-DM 30-2-10 MG/5ML syrup Take 5 mLs by mouth 4 times daily as needed. 120 mL 0       Allergies: Allergies   Allergen Reactions    Pcn [Penicillins] Hives       Problem List:  There is no problem list on file for this patient.       Past Medical History:  History reviewed. No pertinent past medical history. Past Surgical History:  History reviewed. No pertinent surgical history. Social History:    Social History     Tobacco Use    Smoking status: Former Smoker     Packs/day: 0.50   Substance Use Topics    Alcohol use: Never     Frequency: Never                                Counseling given: Not Answered      Vital Signs (Current):   Vitals:    01/13/20 1935 01/13/20 1950 01/13/20 2015 01/13/20 2045   BP: (!) 96/53 (!) 98/45 (!) 105/53 97/63   Pulse: 112 105 119 104   Resp: 20 20 23 21   Temp: 36.4 °C (97.5 °F) 36.6 °C (97.9 °F)     TempSrc:  Oral     SpO2:  100% 100% 100%   Weight:       Height:                                                  BP Readings from Last 3 Encounters:   01/13/20 97/63   01/06/20 134/65       NPO Status:  Pt states NPO > 12 hrs (unsure of exact time)                                                                               BMI:   Wt Readings from Last 3 Encounters:   01/13/20 130 lb (59 kg)     Body mass index is 23.78 kg/m². CBC:   Lab Results   Component Value Date    WBC 7.2 01/13/2020    RBC 3.30 01/13/2020    HGB 9.5 01/13/2020    HCT 29.0 01/13/2020    MCV 87.9 01/13/2020    RDW 13.9 01/13/2020     01/13/2020       CMP:   Lab Results   Component Value Date     01/13/2020    K 4.8 01/13/2020     01/13/2020    CO2 21 01/13/2020    BUN 22 01/13/2020    CREATININE 0.7 01/13/2020    GFRAA >60 01/13/2020    LABGLOM >60 01/13/2020    GLUCOSE 118 01/13/2020    PROT 5.9 01/13/2020    CALCIUM 8.5 01/13/2020    BILITOT 3.9 01/13/2020    ALKPHOS 617 01/13/2020     01/13/2020     01/13/2020       POC Tests: No results for input(s): POCGLU, POCNA, POCK, POCCL, POCBUN, POCHEMO, POCHCT in the last 72 hours.     Coags:   Lab Results   Component Value Date    PROTIME 13.6 01/13/2020    INR 1.2 01/13/2020    APTT 27.6 01/13/2020       HCG (If Applicable): No results found for: PREGTESTUR, PREGSERUM, HCG, HCGQUANT ABGs: No results found for: PHART, PO2ART, IWI5ECF, EGC2OVH, BEART, D2FEIXNT     Type & Screen (If Applicable):  No results found for: LABABO, LABRH    EKG 1/13/20 1/13/2020  6:52 PM - Fred, Mhy Incoming Ekg Results From [facility]     Component Value Ref Range & Units Status Collected Lab   Ventricular Rate 104  BPM Preliminary 01/13/2020  6:48 PM [facility]   Atrial Rate 104  BPM Preliminary 01/13/2020  6:48 PM [facility]   P-R Interval 138  ms Preliminary 01/13/2020  6:48 PM [facility]   QRS Duration 88  ms Preliminary 01/13/2020  6:48 PM [facility]   Q-T Interval 350  ms Preliminary 01/13/2020  6:48 PM [facility]   QTc Calculation (Bazett) 460  ms Preliminary 01/13/2020  6:48 PM [facility]   P [place] 64  degrees Preliminary 01/13/2020  6:48 PM [facility]   R [place] 55  degrees Preliminary 01/13/2020  6:48 PM [facility]   T Axis 2  degrees Preliminary 01/13/2020  6:48 PM [facility]   Testing Performed By     Lab - Abbreviation Name Director Address Valid Date Range   360-[facility] MUSE Unknown Unknown 04/18/16 0721-Present   Narrative & Impression     Sinus tachycardia  ST & T wave abnormality, consider inferior ischemia  Abnormal ECG  When compared with ECG of 06-JAN-2020 17:05,  Significant changes have occurred     CXR 1/13/20  FINDINGS:       The heart is normal in size.  No focal airspace opacity. There is no   pleural effusion. There is no pneumothorax. No free air beneath the   diaphragms. Anesthesia Evaluation  Patient summary reviewed and Nursing notes reviewed no history of anesthetic complications:   Airway: Mallampati: III  TM distance: >3 FB   Neck ROM: full  Mouth opening: < 3 FB Dental: normal exam     Comment: WNL. Denies loose missing chipped teeth. Pulmonary:Negative Pulmonary ROS   (+) decreased breath sounds,      (-) not a current smoker          Patient did not smoke on day of surgery.                  Cardiovascular:Negative CV ROS            Rhythm: regular  Rate: abnormal           Beta Blocker: Not on Beta Blocker        PE comment: ST   Neuro/Psych:   Negative Neuro/Psych ROS              GI/Hepatic/Renal:   (+) GERD: well controlled, liver disease (Elevated AST/ALT (denies drinking)):,          ROS comment: Acute Hematemesis     Acute hepatic failure. Etio unknown. Endo/Other:    (+) blood dyscrasia: anemia:., .                 Abdominal:       Abdomen: tender. Vascular: negative vascular ROS. Anesthesia Plan      general     ASA 4 - emergent     (18 LF - 18 RF)  Induction: intravenous and rapid sequence. MIPS: Prophylactic antiemetics administered and Postoperative trial extubation. Anesthetic plan and risks discussed with patient. Use of blood products discussed with patient whom consented to blood products. Plan discussed with CRNA and attending. Luane Shone, RN   1/13/2020     DOS STAFF ADDENDUM:    Patient seen and chart reviewed. Physical exam and history updated as indicated. NPO status confirmed. Anesthesia options and plan discussed including risks benefits with patient/legal guardian and family as available. Concerns and questions addressed. Consent verbalized to proceed. Anesthesia plan, options and intraoperative/postoperative concerns discussed with care team.    Active hematemesis with relative hypotension compared with ER BP last week. Acutely elevated LFTs/ BR/ BUN. PT elevated slightly. Hb/HCT dropped significantly in last week. Will proceed with GA after fluid resuscitation.     Sterling Kolb MD  1/13/2020  9:39 PM

## 2020-01-14 NOTE — CARE COORDINATION
Met with pt at bedside. Pt lying in bed, has L shoulder discomfort. Pt states she lives with her , is independent in ad's, and is employed. Her PCP is Dr Kt Kenyon and she uses [facility] in [place]. Plan is to return home when stable.

## 2020-01-15 LAB
ALBUMIN SERPL-MCNC: 2.3 G/DL (ref 3.5–5.2)
ALP BLD-CCNC: 373 U/L (ref 35–104)
ALT SERPL-CCNC: 164 U/L (ref 0–32)
ANION GAP SERPL CALCULATED.3IONS-SCNC: 15 MMOL/L (ref 7–16)
AST SERPL-CCNC: 158 U/L (ref 0–31)
BILIRUB SERPL-MCNC: 2.4 MG/DL (ref 0–1.2)
BLOOD BANK DISPENSE STATUS: NORMAL
BLOOD BANK PRODUCT CODE: NORMAL
BPU ID: NORMAL
BUN BLDV-MCNC: 21 MG/DL (ref 6–20)
CALCIUM IONIZED: 1.1 MMOL/L (ref 1.15–1.33)
CALCIUM SERPL-MCNC: 7.3 MG/DL (ref 8.6–10.2)
CHLORIDE BLD-SCNC: 100 MMOL/L (ref 98–107)
CO2: 16 MMOL/L (ref 22–29)
CREAT SERPL-MCNC: 0.7 MG/DL (ref 0.5–1)
DESCRIPTION BLOOD BANK: NORMAL
GFR AFRICAN AMERICAN: >60
GFR NON-AFRICAN AMERICAN: >60 ML/MIN/1.73
GLUCOSE BLD-MCNC: 103 MG/DL (ref 74–99)
HCT VFR BLD CALC: 22.5 % (ref 34–48)
HCT VFR BLD CALC: 22.7 % (ref 34–48)
HCT VFR BLD CALC: 24.2 % (ref 34–48)
HEMOGLOBIN: 7.8 G/DL (ref 11.5–15.5)
HEMOGLOBIN: 7.8 G/DL (ref 11.5–15.5)
HEMOGLOBIN: 8 G/DL (ref 11.5–15.5)
MAGNESIUM: 1.9 MG/DL (ref 1.6–2.6)
MCH RBC QN AUTO: 29 PG (ref 26–35)
MCH RBC QN AUTO: 29.2 PG (ref 26–35)
MCHC RBC AUTO-ENTMCNC: 34.4 % (ref 32–34.5)
MCHC RBC AUTO-ENTMCNC: 34.7 % (ref 32–34.5)
MCV RBC AUTO: 84.3 FL (ref 80–99.9)
MCV RBC AUTO: 84.4 FL (ref 80–99.9)
MRSA CULTURE ONLY: NORMAL
PDW BLD-RTO: 14.3 FL (ref 11.5–15)
PDW BLD-RTO: 14.5 FL (ref 11.5–15)
PHOSPHORUS: 1.9 MG/DL (ref 2.5–4.5)
PLATELET # BLD: 203 E9/L (ref 130–450)
PLATELET # BLD: 216 E9/L (ref 130–450)
PMV BLD AUTO: 9.3 FL (ref 7–12)
PMV BLD AUTO: 9.5 FL (ref 7–12)
POTASSIUM SERPL-SCNC: 4.4 MMOL/L (ref 3.5–5)
RBC # BLD: 2.67 E12/L (ref 3.5–5.5)
RBC # BLD: 2.69 E12/L (ref 3.5–5.5)
SODIUM BLD-SCNC: 131 MMOL/L (ref 132–146)
TOTAL PROTEIN: 4.4 G/DL (ref 6.4–8.3)
WBC # BLD: 9 E9/L (ref 4.5–11.5)
WBC # BLD: 9.3 E9/L (ref 4.5–11.5)

## 2020-01-15 PROCEDURE — 80053 COMPREHEN METABOLIC PANEL: CPT

## 2020-01-15 PROCEDURE — 6360000002 HC RX W HCPCS: Performed by: STUDENT IN AN ORGANIZED HEALTH CARE EDUCATION/TRAINING PROGRAM

## 2020-01-15 PROCEDURE — 36430 TRANSFUSION BLD/BLD COMPNT: CPT

## 2020-01-15 PROCEDURE — 85014 HEMATOCRIT: CPT

## 2020-01-15 PROCEDURE — 6370000000 HC RX 637 (ALT 250 FOR IP): Performed by: SURGERY

## 2020-01-15 PROCEDURE — 84100 ASSAY OF PHOSPHORUS: CPT

## 2020-01-15 PROCEDURE — 83735 ASSAY OF MAGNESIUM: CPT

## 2020-01-15 PROCEDURE — 2500000003 HC RX 250 WO HCPCS: Performed by: SURGERY

## 2020-01-15 PROCEDURE — 36415 COLL VENOUS BLD VENIPUNCTURE: CPT

## 2020-01-15 PROCEDURE — 85018 HEMOGLOBIN: CPT

## 2020-01-15 PROCEDURE — 2580000003 HC RX 258: Performed by: STUDENT IN AN ORGANIZED HEALTH CARE EDUCATION/TRAINING PROGRAM

## 2020-01-15 PROCEDURE — 2580000003 HC RX 258: Performed by: SURGERY

## 2020-01-15 PROCEDURE — 82330 ASSAY OF CALCIUM: CPT

## 2020-01-15 PROCEDURE — C9113 INJ PANTOPRAZOLE SODIUM, VIA: HCPCS | Performed by: STUDENT IN AN ORGANIZED HEALTH CARE EDUCATION/TRAINING PROGRAM

## 2020-01-15 PROCEDURE — 99233 SBSQ HOSP IP/OBS HIGH 50: CPT | Performed by: SURGERY

## 2020-01-15 PROCEDURE — 2060000000 HC ICU INTERMEDIATE R&B

## 2020-01-15 PROCEDURE — 85027 COMPLETE CBC AUTOMATED: CPT

## 2020-01-15 PROCEDURE — P9016 RBC LEUKOCYTES REDUCED: HCPCS

## 2020-01-15 RX ORDER — FLUTICASONE PROPIONATE 50 MCG
1 SPRAY, SUSPENSION (ML) NASAL NIGHTLY
COMMUNITY
Start: 2020-01-10

## 2020-01-15 RX ORDER — 0.9 % SODIUM CHLORIDE 0.9 %
20 INTRAVENOUS SOLUTION INTRAVENOUS ONCE
Status: COMPLETED | OUTPATIENT
Start: 2020-01-15 | End: 2020-01-15

## 2020-01-15 RX ORDER — OXYCODONE HYDROCHLORIDE 10 MG/1
10 TABLET ORAL EVERY 4 HOURS PRN
Status: DISCONTINUED | OUTPATIENT
Start: 2020-01-15 | End: 2020-01-17

## 2020-01-15 RX ORDER — SODIUM CHLORIDE 9 MG/ML
INJECTION, SOLUTION INTRAVENOUS
Status: DISPENSED
Start: 2020-01-15 | End: 2020-01-15

## 2020-01-15 RX ORDER — ERGOCALCIFEROL 1.25 MG/1
50000 CAPSULE ORAL WEEKLY
COMMUNITY
Start: 2019-12-30

## 2020-01-15 RX ORDER — BUSPIRONE HYDROCHLORIDE 15 MG/1
15 TABLET ORAL 2 TIMES DAILY
COMMUNITY
Start: 2020-01-07

## 2020-01-15 RX ORDER — DICLOFENAC SODIUM 75 MG/1
75 TABLET, DELAYED RELEASE ORAL 2 TIMES DAILY
COMMUNITY
Start: 2019-12-30 | End: 2021-03-21 | Stop reason: ALTCHOICE

## 2020-01-15 RX ORDER — PANTOPRAZOLE SODIUM 40 MG/1
40 TABLET, DELAYED RELEASE ORAL
Status: DISCONTINUED | OUTPATIENT
Start: 2020-01-16 | End: 2020-01-17 | Stop reason: HOSPADM

## 2020-01-15 RX ORDER — OXYCODONE HYDROCHLORIDE 5 MG/1
5 TABLET ORAL EVERY 4 HOURS PRN
Status: DISCONTINUED | OUTPATIENT
Start: 2020-01-15 | End: 2020-01-17

## 2020-01-15 RX ORDER — ATORVASTATIN CALCIUM 20 MG/1
20 TABLET, FILM COATED ORAL DAILY
COMMUNITY
Start: 2020-01-07

## 2020-01-15 RX ORDER — OMEPRAZOLE 10 MG/1
10 CAPSULE, DELAYED RELEASE ORAL DAILY PRN
Status: ON HOLD | COMMUNITY
Start: 2017-03-06 | End: 2020-01-17 | Stop reason: HOSPADM

## 2020-01-15 RX ORDER — VENLAFAXINE HYDROCHLORIDE 150 MG/1
150 CAPSULE, EXTENDED RELEASE ORAL DAILY
COMMUNITY
Start: 2020-01-07

## 2020-01-15 RX ADMIN — Medication 10 ML: at 08:31

## 2020-01-15 RX ADMIN — OXYCODONE HYDROCHLORIDE 10 MG: 10 TABLET ORAL at 21:08

## 2020-01-15 RX ADMIN — Medication 10 ML: at 00:21

## 2020-01-15 RX ADMIN — MORPHINE SULFATE 2 MG: 2 INJECTION, SOLUTION INTRAMUSCULAR; INTRAVENOUS at 01:28

## 2020-01-15 RX ADMIN — OXYCODONE HYDROCHLORIDE 10 MG: 10 TABLET ORAL at 17:03

## 2020-01-15 RX ADMIN — SODIUM CHLORIDE, POTASSIUM CHLORIDE, SODIUM LACTATE AND CALCIUM CHLORIDE: 600; 310; 30; 20 INJECTION, SOLUTION INTRAVENOUS at 09:31

## 2020-01-15 RX ADMIN — SODIUM PHOSPHATE, MONOBASIC, MONOHYDRATE 30 MMOL: 276; 142 INJECTION, SOLUTION INTRAVENOUS at 12:05

## 2020-01-15 RX ADMIN — Medication 10 ML: at 21:08

## 2020-01-15 RX ADMIN — MORPHINE SULFATE 2 MG: 2 INJECTION, SOLUTION INTRAMUSCULAR; INTRAVENOUS at 08:31

## 2020-01-15 RX ADMIN — MORPHINE SULFATE 2 MG: 2 INJECTION, SOLUTION INTRAMUSCULAR; INTRAVENOUS at 05:58

## 2020-01-15 RX ADMIN — PANTOPRAZOLE SODIUM 8 MG/HR: 40 INJECTION, POWDER, FOR SOLUTION INTRAVENOUS at 09:31

## 2020-01-15 RX ADMIN — SODIUM CHLORIDE 20 ML: 9 INJECTION, SOLUTION INTRAVENOUS at 12:06

## 2020-01-15 ASSESSMENT — PAIN DESCRIPTION - LOCATION
LOCATION: SHOULDER
LOCATION: ARM;SHOULDER
LOCATION: SHOULDER
LOCATION: SHOULDER
LOCATION: ARM;SHOULDER
LOCATION: SHOULDER

## 2020-01-15 ASSESSMENT — PAIN SCALES - GENERAL
PAINLEVEL_OUTOF10: 6
PAINLEVEL_OUTOF10: 10
PAINLEVEL_OUTOF10: 7
PAINLEVEL_OUTOF10: 7
PAINLEVEL_OUTOF10: 6
PAINLEVEL_OUTOF10: 0
PAINLEVEL_OUTOF10: 8
PAINLEVEL_OUTOF10: 10
PAINLEVEL_OUTOF10: 9
PAINLEVEL_OUTOF10: 0
PAINLEVEL_OUTOF10: 10

## 2020-01-15 ASSESSMENT — PAIN DESCRIPTION - FREQUENCY
FREQUENCY: CONTINUOUS

## 2020-01-15 ASSESSMENT — PAIN DESCRIPTION - PAIN TYPE
TYPE: ACUTE PAIN
TYPE: ACUTE PAIN;CHRONIC PAIN
TYPE: ACUTE PAIN
TYPE: ACUTE PAIN;CHRONIC PAIN
TYPE: CHRONIC PAIN
TYPE: ACUTE PAIN;CHRONIC PAIN

## 2020-01-15 ASSESSMENT — PAIN DESCRIPTION - ORIENTATION
ORIENTATION: LEFT

## 2020-01-15 ASSESSMENT — PAIN DESCRIPTION - ONSET
ONSET: ON-GOING
ONSET: GRADUAL

## 2020-01-15 ASSESSMENT — PAIN DESCRIPTION - PROGRESSION
CLINICAL_PROGRESSION: NOT CHANGED
CLINICAL_PROGRESSION: GRADUALLY IMPROVING

## 2020-01-15 ASSESSMENT — PAIN DESCRIPTION - DESCRIPTORS
DESCRIPTORS: PINS AND NEEDLES;SORE
DESCRIPTORS: PINS AND NEEDLES
DESCRIPTORS: ACHING;DISCOMFORT;SORE;TENDER
DESCRIPTORS: ACHING;CONSTANT;DISCOMFORT;SORE
DESCRIPTORS: ACHING;CONSTANT;DISCOMFORT

## 2020-01-15 NOTE — PROGRESS NOTES
CRITICAL CARE ATTENDING     CC: UGIB    SIGNIFICANT 24 HOUR EVENTS/NEW COMPLAINTS:  1/14/20 - admitted to SICU last night with hematemesis, found to have Dieulafoy's lesion on EGD which was clipped and injected with epi ;c/o hunger and thirst, no abdominal pain, passing gas    PHYSICAL EXAM:  Vitals:    01/14/20 1500 01/14/20 1600 01/14/20 1700 01/14/20 1800   BP: (!) 121/55 128/67 (!) 130/57 (!) 117/53   Pulse: 106 108 109 113   Resp: 20 23 22 22   Temp:  98.2 °F (36.8 °C)  99.3 °F (37.4 °C)   TempSrc:       SpO2: 96% 98% 97% 93%   Weight:       Height:           I/O last 3 completed shifts: In: 5393 [I.V.:2935; Blood:300]  Out: 601 [Urine:600; Stool:1]    Neuro - Awake, alert, attentive   HEENT - oral cavity moist  CV - sinus tachycardia , well-perfused  Pulm - non labored, on room air  Abdomen - non distended, non tender   Musculoskeletal - bilateral LE SCDs, no deformities  Skin - cool, pale  Tubes/drains -   Lines - PIVs    ASSESSMENT/PLAN:  --Upper GI bleed due to Dieulafoy lesion    -serial H&H   -PPI    --Hemorrhagic shock due to UGIB - stable   -Monitor hemodynamics/urine output   -serial lactate and H&H   -crystalloid fluids/RBC transfusions    --elevated LFTS - possibly r/t recent viral illness, hepatitis viral panel negative   -imaging shows no biliary disease   -monitor    --splenomegaly mild, and small splenic lesion   -will need f/u    --Acute blood loss anemia - worsened   -Hgb =  6.2  -no active bleeding  -transfuse 2 units rbcs d/t symptomatic - tachy  -serial  H&H         Analgesia/sedation plan: n/a  Prophylaxis:  SCDs, hold chemopro  Code Status: FULL  Disposition: ICU      The patient is at significant risk for life-threatening hemorrhage and CV deterioration and death and requires ongoing critical care management.   Critical care time exclusive of teaching and procedures =  43 minutes      Loyd Rinne, MD, FACS

## 2020-01-15 NOTE — PLAN OF CARE
Problem: Pain:  Goal: Pain level will decrease  Description  Pain level will decrease  Outcome: Met This Shift  Goal: Control of acute pain  Description  Control of acute pain  Outcome: Met This Shift  Goal: Control of chronic pain  Description  Control of chronic pain  Outcome: Not Met This Shift     Problem: Risk for Impaired Skin Integrity  Goal: Tissue integrity - skin and mucous membranes  Description  Structural intactness and normal physiological function of skin and  mucous membranes.   Outcome: Met This Shift     Problem: Falls - Risk of:  Goal: Will remain free from falls  Description  Will remain free from falls  Outcome: Met This Shift  Goal: Absence of physical injury  Description  Absence of physical injury  Outcome: Met This Shift

## 2020-01-16 ENCOUNTER — APPOINTMENT (OUTPATIENT)
Dept: MRI IMAGING | Age: 55
DRG: 377 | End: 2020-01-16
Payer: COMMERCIAL

## 2020-01-16 LAB
ALBUMIN SERPL-MCNC: 2.4 G/DL (ref 3.5–5.2)
ALP BLD-CCNC: 384 U/L (ref 35–104)
ALT SERPL-CCNC: 159 U/L (ref 0–32)
ANION GAP SERPL CALCULATED.3IONS-SCNC: 12 MMOL/L (ref 7–16)
AST SERPL-CCNC: 156 U/L (ref 0–31)
BILIRUB SERPL-MCNC: 2.4 MG/DL (ref 0–1.2)
BILIRUBIN DIRECT: 2 MG/DL (ref 0–0.3)
BILIRUBIN, INDIRECT: 0.4 MG/DL (ref 0–1)
BUN BLDV-MCNC: 9 MG/DL (ref 6–20)
CALCIUM SERPL-MCNC: 7.7 MG/DL (ref 8.6–10.2)
CHLORIDE BLD-SCNC: 97 MMOL/L (ref 98–107)
CO2: 24 MMOL/L (ref 22–29)
CREAT SERPL-MCNC: 0.6 MG/DL (ref 0.5–1)
GFR AFRICAN AMERICAN: >60
GFR NON-AFRICAN AMERICAN: >60 ML/MIN/1.73
GLUCOSE BLD-MCNC: 109 MG/DL (ref 74–99)
HCT VFR BLD CALC: 28.1 % (ref 34–48)
HEMOGLOBIN: 9.3 G/DL (ref 11.5–15.5)
MCH RBC QN AUTO: 28.4 PG (ref 26–35)
MCHC RBC AUTO-ENTMCNC: 33.1 % (ref 32–34.5)
MCV RBC AUTO: 85.7 FL (ref 80–99.9)
PDW BLD-RTO: 14.6 FL (ref 11.5–15)
PLATELET # BLD: 215 E9/L (ref 130–450)
PMV BLD AUTO: 9 FL (ref 7–12)
POTASSIUM SERPL-SCNC: 3.5 MMOL/L (ref 3.5–5)
RBC # BLD: 3.28 E12/L (ref 3.5–5.5)
SODIUM BLD-SCNC: 133 MMOL/L (ref 132–146)
TOTAL PROTEIN: 5 G/DL (ref 6.4–8.3)
WBC # BLD: 9.3 E9/L (ref 4.5–11.5)

## 2020-01-16 PROCEDURE — 2060000000 HC ICU INTERMEDIATE R&B

## 2020-01-16 PROCEDURE — 2580000003 HC RX 258: Performed by: SURGERY

## 2020-01-16 PROCEDURE — A9579 GAD-BASE MR CONTRAST NOS,1ML: HCPCS | Performed by: RADIOLOGY

## 2020-01-16 PROCEDURE — 97162 PT EVAL MOD COMPLEX 30 MIN: CPT

## 2020-01-16 PROCEDURE — 6370000000 HC RX 637 (ALT 250 FOR IP): Performed by: SURGERY

## 2020-01-16 PROCEDURE — 74183 MRI ABD W/O CNTR FLWD CNTR: CPT

## 2020-01-16 PROCEDURE — 80048 BASIC METABOLIC PNL TOTAL CA: CPT

## 2020-01-16 PROCEDURE — 99232 SBSQ HOSP IP/OBS MODERATE 35: CPT | Performed by: SURGERY

## 2020-01-16 PROCEDURE — 97530 THERAPEUTIC ACTIVITIES: CPT

## 2020-01-16 PROCEDURE — 80076 HEPATIC FUNCTION PANEL: CPT

## 2020-01-16 PROCEDURE — 6360000004 HC RX CONTRAST MEDICATION: Performed by: RADIOLOGY

## 2020-01-16 PROCEDURE — 85027 COMPLETE CBC AUTOMATED: CPT

## 2020-01-16 PROCEDURE — 36415 COLL VENOUS BLD VENIPUNCTURE: CPT

## 2020-01-16 RX ADMIN — OXYCODONE HYDROCHLORIDE 10 MG: 10 TABLET ORAL at 18:06

## 2020-01-16 RX ADMIN — GADOTERIDOL 15 ML: 279.3 INJECTION, SOLUTION INTRAVENOUS at 17:31

## 2020-01-16 RX ADMIN — OXYCODONE HYDROCHLORIDE 10 MG: 10 TABLET ORAL at 01:25

## 2020-01-16 RX ADMIN — OXYCODONE HYDROCHLORIDE 10 MG: 10 TABLET ORAL at 06:12

## 2020-01-16 RX ADMIN — OXYCODONE HYDROCHLORIDE 10 MG: 10 TABLET ORAL at 14:05

## 2020-01-16 RX ADMIN — Medication 10 ML: at 09:45

## 2020-01-16 RX ADMIN — OXYCODONE HYDROCHLORIDE 10 MG: 10 TABLET ORAL at 09:55

## 2020-01-16 RX ADMIN — Medication 10 ML: at 22:21

## 2020-01-16 RX ADMIN — OXYCODONE HYDROCHLORIDE 10 MG: 10 TABLET ORAL at 22:21

## 2020-01-16 ASSESSMENT — PAIN SCALES - GENERAL
PAINLEVEL_OUTOF10: 10

## 2020-01-16 ASSESSMENT — PAIN DESCRIPTION - ORIENTATION
ORIENTATION: LEFT
ORIENTATION: LEFT

## 2020-01-16 ASSESSMENT — PAIN DESCRIPTION - PAIN TYPE
TYPE: ACUTE PAIN
TYPE: ACUTE PAIN

## 2020-01-16 ASSESSMENT — PAIN DESCRIPTION - LOCATION
LOCATION: SHOULDER;ARM
LOCATION: SHOULDER

## 2020-01-16 ASSESSMENT — PAIN DESCRIPTION - PROGRESSION: CLINICAL_PROGRESSION: NOT CHANGED

## 2020-01-16 ASSESSMENT — PAIN DESCRIPTION - FREQUENCY: FREQUENCY: CONTINUOUS

## 2020-01-16 ASSESSMENT — PAIN DESCRIPTION - ONSET: ONSET: ON-GOING

## 2020-01-16 NOTE — PROGRESS NOTES
[facility]   ATTENDING PHYSICIAN PROGRESS NOTE     I have examined the patient, reviewed the record, and discussed the case with the APN/ Resident. I have reviewed all relevant labs and imaging data. The following summarizes my clinical findings and independent assessment. CC: GI bleed    Pt states she is getting upset waiting for MRCP to be completed.     Awake and alert  Follows commands  Hrt:  Regular  Lungs:  Fairly clear bilaterally  Abd:  Soft; BS+; NT/ND  Skin:  Warm/dry    Patient Active Problem List    Diagnosis Date Noted    Gastrointestinal hemorrhage     Hematemesis 01/13/2020       GI bleed--s/p EGD with clipping/injection of Dieulafoy lesion  Monitor H/H  Elevated LFTs--monitor labs  For MRCP--await results  PUD diet after MRCP  PCDs    Dee Dee Rodriguez MD, FACS  1/16/2020  3:07 PM

## 2020-01-16 NOTE — PROGRESS NOTES
[address] PICU  Physical Therapy Initial Evaluation    Name: Radha Adams  : 1965  MRN: [id number]    Date of Service: 2020    Evaluating Therapist: Leda Chamberlain PT, DPT  [id number]    Room #: 6057/7640-P  DIAGNOSIS: hematemesis  PROCEDURE: Esophagogastroduodenoscopy, Epinephrine injection, Endoclip application 36  PRECAUTIONS: Falls, WBAT LUE    Social:  Pt lives with her  in a 2 story home with 2 steps and no HR to enter. Pt reports there being 14 steps and 1 HR. Pt was Independent for amb and does not use AD for amb. Initial Evaluation  Date:  Treatment  Short Term/ Long Term   Goals   Was pt agreeable to Eval/treatment? Yes     Does pt have pain? 10/10 L shoulder and LUE     Bed Mobility  Rolling: SBA  Supine to sit: SBA  Sit to supine: SBA  Scooting: SBA  Independent    Transfers Sit to stand: SBA  Stand to sit: SBA  Stand pivot: SBA  Independent    Ambulation   20 feet using no AD with SBA<>CGA  >200 feet using no AD with Bartholomew   Stair negotiation:  NT  14 steps using 1 rail with Mod Independent    ROM RLE: WFL  LLE: WFL     Strength BLE: at least 4-/5  Questionable effort due to pt distracted by shoulder pain     Balance   Sitting: SBA  Standing: SBA<>CGA     AM-PAC Basic Mobility Inpatient Short Form Raw Score:  15/24       Patient is A&Ox4. Sensation: numbness/tingling in LUE   Coordination: NT  Edema: None      ASSESSMENT  Pt displays functional ability as noted in the objective portion of this evaluation. Comments/Treatment:  Pt was cleared by RN prior to PT evaluation. Pt was received supine and was agreeable to PT evaluation. Pt exhibited guarding of LUE and reported hypersensitivity to touch. Pt very limited with all mobility due to L shoulder/LUE pain. Discussed with pt the benefits of performing cervical ROM and gentle AAROM to prevent possible shoulder adhesion.   Pt's  reports pt has had guarded position of LUE for approximately 3 months, but significantly increased since fall. Pt amb with B knee flexion and FFP without use of AD. Pt able to correct posture with VCs, but did not maintain reporting B knee extension and upright posture increased LUE pain. Pt refused further amb due to LUE pain increasing with mobility. Pt declined sitting up in bedside chair and returned to supine. Pt education provided on benefits of increasing OOB activity. Pt was left with call button within reach and all needs met.  at bedside. Patient education  Pt educated on PT role, safety with mobility, transfer technique, gait training and postural reducation. Education provided on benefits of OOB activity to prevent iatrogenic effects. Patient response to education:   Pt verbalized understanding Pt demonstrated skill Pt requires further education in this area   Yes Requires assist/VCs Yes     Rehab potential is Good for reaching above PT goals. Pts/ family goals   1. To return home. Patient and or family understand(s) diagnosis, prognosis, and plan of care. PLAN  PT care will be provided in accordance with the objectives noted above. Whenever appropriate, clear delegation orders will be provided for nursing staff. Exercises and functional mobility practice will be used as well as appropriate assistive devices or modalities to obtain goals. Patient and family education will also be administered as needed. Frequency of treatments will be 2-5x/week x 7-10 days.     Time in: 1550  Time out: [address] [place], [place], [facility]  License [id number]

## 2020-01-16 NOTE — DISCHARGE SUMMARY
Physician Discharge Summary     Patient ID:  Krysta Barrios  [id number]  31 y.o.  1965    Admit date: 1/13/2020    Discharge date and time: 1/17/2020  4:45 PM     Admitting Physician: Renee Khanna MD     Admission Diagnoses: Hematemesis [K92.0]    Discharge Diagnoses: Active Problems:    Hematemesis    Gastrointestinal hemorrhage  Resolved Problems:    * No resolved hospital problems. *      Admission Condition: critical    Discharged Condition: Stable    Indication for Admission: upper GI bleed with hemorrhagic shock    HPI 53 y. o. female who presents for evaluation of hematemesis.  Patient was seen Monday of last week for nausea, vomiting, diarrhea.  She was feeling better than started to feel worse today  and began having hematemesis. Patient states she has been feeling dizzy and lightheaded for the past 2 weeks and passed out and hit her left side and shoulder.  Hx GERD with no Hx EGD had a colonoscopy about 3 years with Dr. Vick Mejia, states her polyps that were benign.  States she has been taking Motrin 800 mg every 6 hours for the past 10 days.  Patient denies any history of clotting disorder, no recent travel, no sick contacts.  Denies any history of liver disease.  Admits to a glass of wine every night with dinner, denies recreational drug use.      Hospital Course/Procedures/Operation/treatments:   1/13: 1 unit RBC transfused in ED. Stat EGD showed bleeding Dieulafoy lesion which was clipped and injected with epinephrine. Admitted to SICU postoperatively  1/14: 2 units RBC transfused. RUQ US for abnormal LFTs showed contracted gallbladder  1/15: 1 unit RBC transfused for tachycardia  1/16: transferred from SICU. Had 3 brown BMs. hgb stable. LFTs remain elevated. MRCP pending. Complains of left shoulder and abdominal pain. Ortho consulted for shoulder strain s/p fall. 1/17:  Continues to have bloody stool per patient, feels well. Monospot +. Follow up with ortho as outpatient.     Consults: IP CONSULT TO ORTHOPEDIC SURGERY    Significant Diagnostic Studies:   Ct Head Wo Contrast    Result Date: 2020  Clinical indications: Pain status post trauma. Exposure control: This examination and all examinations utilizing ionizing radiation at this facility are done so according to the ALARA (as low as reasonably achievable) principal for radiation dose reduction. COMPARISON: None. TECHNIQUE: Axial, sagittal, and coronal computed tomography of the brain and calvarium was performed without contrast. FINDINGS: There is no evidence for acute intracranial hemorrhage, midline shift or mass effect. The brain parenchyma, CSF spaces, paranasal sinuses and mastoid air cells and surrounding soft tissue and osseous structures have a satisfactory appearance. The gray-white matter junction is preserved. The cerebellopontine angle and internal auditory canals have a normal appearance. The sella turcica and pituitary gland are unremarkable. No evidence for acute intracranial process. CT of the brain is grossly unremarkable. Xr Shoulder Left (min 2 Views)    Result Date: 2020  Patient MRN:  [id number] : 1965 Age: 53 years Gender: Female Order Date:  2020 7:00 PM EXAM: XR SHOULDER LEFT (MIN 2 VIEWS) COMPARISON: None INDICATION: Pain VIEWS: Four views of the shoulder FINDINGS: No acute fracture or dislocation. No acromioclavicular joint separation identified. No evidence of fracture or dislocation of the shoulder. Us Gallbladder Ruq    Result Date: 2020  Patient MRN:  [id number] : 1965 Age: 53 years Gender: Female Order Date:  2020 11:45 PM TECHNIQUE/NUMBER OF IMAGES/COMPARISON/CLINICAL HISTORY: Color Doppler ultrasound. 16 images Grayscale color Doppler ultrasound Abnormal liver function tests. FINDINGS: Gallbladder is contracted, and the time of the study. . There is some sludge present in the gallbladder lumen. The are in the dependent portion of the gallbladder.  They are calyceal calcifications are identified in the bilateral kidneys. No evidence of hydronephrosis. The kidneys are negative for evidence of solid mass or hydronephrosis. There are tiny renal cysts. There is no evidence of free fluid, free air, or gastrointestinal obstruction. There is no evidence for mesenteric inflammation or lymphadenopathy. The aorta and its branches enhance appropriately. Splenomegaly with a 2.5 x 1.1 cm hypoenhancing mass within the periphery of the spleen which might represent a prior laceration or mass lesion. Punctate calyceal nonobstructing calcifications within the kidneys. Discharge Exam:  Awake and alert  Follows commands  Hrt:  Regular  Lungs:  Fairly clear bilaterally  Abd:  Soft; BS+; NT/ND  Skin:  Warm/dry    Disposition: home    In process/preliminary results:  Outstanding Order Results     No orders found from 12/15/2019 to 1/14/2020.           Patient Instructions:   Discharge Medication List as of 1/17/2020  3:14 PM           Details   pantoprazole (PROTONIX) 40 MG tablet Take 1 tablet by mouth 2 times daily (before meals), Disp-60 tablet, R-0Normal              Details   venlafaxine (EFFEXOR XR) 150 MG extended release capsule Take 150 mg by mouth dailyHistorical Med      fluticasone (FLONASE) 50 MCG/ACT nasal spray 1 spray by Each Nostril route nightlyHistorical Med      vitamin D (ERGOCALCIFEROL) 1.25 MG (43315 UT) CAPS capsule Take 50,000 Units by mouth once a week SundayHistorical Med      busPIRone (BUSPAR) 15 MG tablet Take 15 mg by mouth 2 times dailyHistorical Med      atorvastatin (LIPITOR) 20 MG tablet Take 20 mg by mouth dailyHistorical Med      diclofenac (VOLTAREN) 75 MG EC tablet Take 75 mg by mouth 2 times dailyHistorical Med      ondansetron (ZOFRAN ODT) 8 MG TBDP disintegrating tablet Take 1 tablet by mouth every 8 hours as needed for Nausea, Disp-12 tablet, R-1Print      brompheniramine-pseudoephedrine-DM 30-2-10 MG/5ML syrup Take 5 mLs by mouth 4 times daily as needed. , Disp-120 mL, R-0                Upper Gastrointestinal Bleeding: Care Instructions  Your Care Instructions    The digestive or gastrointestinal tract goes from the mouth to the anus. It is often called the GI tract. Bleeding in the upper GI tract can happen anywhere from the esophagus to the first part of the small intestine. Sometimes it's caused by an ulcer in your stomach. Or it may be caused by blood vessels in your esophagus. Your esophagus is the tube that carries food from your throat to your stomach. Light bleeding may not cause any symptoms at first. But if you continue to bleed for a while, you may feel very weak or tired. Sudden, heavy bleeding means you need to see a doctor right away. This kind of bleeding can be very dangerous. But it can usually be cured or controlled. The doctor may do some tests to find the cause of your bleeding. Follow-up care is a key part of your treatment and safety. Be sure to make and go to all appointments, and call your doctor if you are having problems. It's also a good idea to know your test results and keep a list of the medicines you take. How can you care for yourself at home? · Be safe with medicines. Take your medicines exactly as prescribed. Call your doctor if you think you are having a problem with your medicine. You will get more details on the specific medicines your doctor prescribes. · Do not take aspirin or other anti-inflammatory medicines, such as naproxen (Aleve) or ibuprofen (Advil, Motrin), without talking to your doctor first. Ask your doctor if it is okay to use acetaminophen (Tylenol). · Do not drink alcohol. · The bleeding may make you lose iron. So it's important to eat foods that have a lot of iron. These include red meat, shellfish, poultry, and eggs. They also include beans, raisins, whole-grain breads, and leafy green vegetables. If you want help planning meals, you can meet with a dietitian.   When should you call for help?  Call 911 anytime you think you may need emergency care. For example, call if:    · You have sudden, severe belly pain.     · You vomit blood or what looks like coffee grounds.     · You passed out (lost consciousness).     · Your stools are maroon or very bloody.    Call your doctor now or seek immediate medical care if:    · You are dizzy or lightheaded, or you feel like you may faint.     · Your stools are black and look like tar.     · You have belly pain.     · You vomit or have nausea.     · You have trouble swallowing, or it hurts when you swallow.    Watch closely for changes in your health, and be sure to contact your doctor if you do not get better as expected. Where can you learn more? Go to https://Tr[employer]Sync.J[employer]A. org and sign in to your [employer] account. Enter X923 in the SplitSecnd box to learn more about \"Upper Gastrointestinal Bleeding: Care Instructions. \"     If you do not have an account, please click on the \"Sign Up Now\" link. Current as of: June 26, 2019  Content Version: 12.3  © 5062-1732 Healthwise, Incorporated. Care instructions adapted under license by [facility] ([facility]).  If you have questions about a medical condition or this instruction, always ask your healthcare professional. [place] [address] any warranty or liability for your use of this information.         Follow up:   Kwan Arango.,   [facility] [address] [phone]    Schedule an appointment as soon as possible for a visit in 1 week      Elan Pinto MD  [address] [facility] [phone]    Schedule an appointment as soon as possible for a visit in 2 weeks  For routine follow up    [facility], [phone]      As needed    [address] [facility] [address]  [address] [facility] [id number]  Schedule an appointment as positive S1/positive S2

## 2020-01-17 VITALS
WEIGHT: 167 LBS | BODY MASS INDEX: 30.73 KG/M2 | TEMPERATURE: 97.8 F | SYSTOLIC BLOOD PRESSURE: 138 MMHG | RESPIRATION RATE: 20 BRPM | HEART RATE: 98 BPM | OXYGEN SATURATION: 95 % | DIASTOLIC BLOOD PRESSURE: 66 MMHG | HEIGHT: 62 IN

## 2020-01-17 LAB
ALBUMIN SERPL-MCNC: 2.7 G/DL (ref 3.5–5.2)
ALP BLD-CCNC: 403 U/L (ref 35–104)
ALT SERPL-CCNC: 145 U/L (ref 0–32)
ANION GAP SERPL CALCULATED.3IONS-SCNC: 14 MMOL/L (ref 7–16)
AST SERPL-CCNC: 127 U/L (ref 0–31)
BILIRUB SERPL-MCNC: 2.1 MG/DL (ref 0–1.2)
BILIRUBIN DIRECT: 1.7 MG/DL (ref 0–0.3)
BILIRUBIN, INDIRECT: 0.4 MG/DL (ref 0–1)
BUN BLDV-MCNC: 6 MG/DL (ref 6–20)
CALCIUM SERPL-MCNC: 7.8 MG/DL (ref 8.6–10.2)
CHLORIDE BLD-SCNC: 92 MMOL/L (ref 98–107)
CO2: 24 MMOL/L (ref 22–29)
CREAT SERPL-MCNC: 0.5 MG/DL (ref 0.5–1)
GFR AFRICAN AMERICAN: >60
GFR NON-AFRICAN AMERICAN: >60 ML/MIN/1.73
GLUCOSE BLD-MCNC: 109 MG/DL (ref 74–99)
HCT VFR BLD CALC: 30.1 % (ref 34–48)
HEMOGLOBIN: 10 G/DL (ref 11.5–15.5)
MCH RBC QN AUTO: 28.7 PG (ref 26–35)
MCHC RBC AUTO-ENTMCNC: 33.2 % (ref 32–34.5)
MCV RBC AUTO: 86.5 FL (ref 80–99.9)
MONO TEST: POSITIVE
PDW BLD-RTO: 14.6 FL (ref 11.5–15)
PLATELET # BLD: 242 E9/L (ref 130–450)
PMV BLD AUTO: 9.1 FL (ref 7–12)
POTASSIUM SERPL-SCNC: 3.1 MMOL/L (ref 3.5–5)
RBC # BLD: 3.48 E12/L (ref 3.5–5.5)
SODIUM BLD-SCNC: 130 MMOL/L (ref 132–146)
TOTAL PROTEIN: 5.3 G/DL (ref 6.4–8.3)
WBC # BLD: 9.3 E9/L (ref 4.5–11.5)

## 2020-01-17 PROCEDURE — 6370000000 HC RX 637 (ALT 250 FOR IP): Performed by: STUDENT IN AN ORGANIZED HEALTH CARE EDUCATION/TRAINING PROGRAM

## 2020-01-17 PROCEDURE — 86308 HETEROPHILE ANTIBODY SCREEN: CPT

## 2020-01-17 PROCEDURE — 85027 COMPLETE CBC AUTOMATED: CPT

## 2020-01-17 PROCEDURE — 80048 BASIC METABOLIC PNL TOTAL CA: CPT

## 2020-01-17 PROCEDURE — 36415 COLL VENOUS BLD VENIPUNCTURE: CPT

## 2020-01-17 PROCEDURE — 6370000000 HC RX 637 (ALT 250 FOR IP): Performed by: SURGERY

## 2020-01-17 PROCEDURE — 80076 HEPATIC FUNCTION PANEL: CPT

## 2020-01-17 PROCEDURE — 2580000003 HC RX 258: Performed by: SURGERY

## 2020-01-17 RX ORDER — ACETAMINOPHEN 325 MG/1
650 TABLET ORAL EVERY 4 HOURS PRN
Status: DISCONTINUED | OUTPATIENT
Start: 2020-01-17 | End: 2020-01-17

## 2020-01-17 RX ORDER — ACETAMINOPHEN 325 MG/1
650 TABLET ORAL EVERY 4 HOURS PRN
Status: DISCONTINUED | OUTPATIENT
Start: 2020-01-17 | End: 2020-01-17 | Stop reason: HOSPADM

## 2020-01-17 RX ORDER — POTASSIUM CHLORIDE 20 MEQ/1
40 TABLET, EXTENDED RELEASE ORAL
Status: COMPLETED | OUTPATIENT
Start: 2020-01-17 | End: 2020-01-17

## 2020-01-17 RX ORDER — PANTOPRAZOLE SODIUM 40 MG/1
40 TABLET, DELAYED RELEASE ORAL
Qty: 60 TABLET | Refills: 0 | Status: ON HOLD | OUTPATIENT
Start: 2020-01-17 | End: 2020-05-29 | Stop reason: SDUPTHER

## 2020-01-17 RX ADMIN — Medication 10 ML: at 08:24

## 2020-01-17 RX ADMIN — OXYCODONE HYDROCHLORIDE 10 MG: 10 TABLET ORAL at 02:40

## 2020-01-17 RX ADMIN — POTASSIUM CHLORIDE 40 MEQ: 1500 TABLET, EXTENDED RELEASE ORAL at 08:23

## 2020-01-17 RX ADMIN — POTASSIUM CHLORIDE 40 MEQ: 1500 TABLET, EXTENDED RELEASE ORAL at 10:07

## 2020-01-17 RX ADMIN — OXYCODONE HYDROCHLORIDE 10 MG: 10 TABLET ORAL at 06:40

## 2020-01-17 RX ADMIN — ACETAMINOPHEN 650 MG: 325 TABLET, FILM COATED ORAL at 12:54

## 2020-01-17 RX ADMIN — PANTOPRAZOLE SODIUM 40 MG: 40 TABLET, DELAYED RELEASE ORAL at 06:39

## 2020-01-17 ASSESSMENT — PAIN SCALES - GENERAL
PAINLEVEL_OUTOF10: 10
PAINLEVEL_OUTOF10: 10
PAINLEVEL_OUTOF10: 8
PAINLEVEL_OUTOF10: 8
PAINLEVEL_OUTOF10: 4
PAINLEVEL_OUTOF10: 10
PAINLEVEL_OUTOF10: 0

## 2020-01-17 ASSESSMENT — PAIN DESCRIPTION - FREQUENCY
FREQUENCY: CONTINUOUS

## 2020-01-17 ASSESSMENT — PAIN DESCRIPTION - DESCRIPTORS
DESCRIPTORS: ACHING;CONSTANT;PINS AND NEEDLES
DESCRIPTORS: ACHING;CONSTANT
DESCRIPTORS: ACHING;CONSTANT;DISCOMFORT

## 2020-01-17 ASSESSMENT — PAIN DESCRIPTION - PROGRESSION
CLINICAL_PROGRESSION: GRADUALLY WORSENING
CLINICAL_PROGRESSION: NOT CHANGED
CLINICAL_PROGRESSION: GRADUALLY IMPROVING
CLINICAL_PROGRESSION: NOT CHANGED
CLINICAL_PROGRESSION: GRADUALLY WORSENING

## 2020-01-17 ASSESSMENT — PAIN DESCRIPTION - PAIN TYPE
TYPE: ACUTE PAIN

## 2020-01-17 ASSESSMENT — PAIN DESCRIPTION - ORIENTATION
ORIENTATION: LEFT

## 2020-01-17 ASSESSMENT — PAIN DESCRIPTION - ONSET
ONSET: ON-GOING

## 2020-01-17 ASSESSMENT — PAIN DESCRIPTION - LOCATION
LOCATION: SHOULDER
LOCATION: SHOULDER
LOCATION: ARM;SHOULDER
LOCATION: ARM;SHOULDER

## 2020-01-17 ASSESSMENT — PAIN - FUNCTIONAL ASSESSMENT
PAIN_FUNCTIONAL_ASSESSMENT: PREVENTS OR INTERFERES WITH MANY ACTIVE NOT PASSIVE ACTIVITIES
PAIN_FUNCTIONAL_ASSESSMENT: PREVENTS OR INTERFERES SOME ACTIVE ACTIVITIES AND ADLS

## 2020-01-17 NOTE — PROGRESS NOTES
All discharge paperwork reviewed with patient. She appeared to understand all discharge instructions, asking good questions. Addendum:  Patient educated on Peptic Ulcer diet. Handout provided and reviewed with patient.

## 2020-01-18 LAB — CULTURE, BLOOD 2: NORMAL

## 2020-02-06 ENCOUNTER — OFFICE VISIT (OUTPATIENT)
Dept: SURGERY | Age: 55
End: 2020-02-06
Payer: COMMERCIAL

## 2020-02-06 VITALS
DIASTOLIC BLOOD PRESSURE: 68 MMHG | HEART RATE: 87 BPM | HEIGHT: 62 IN | OXYGEN SATURATION: 97 % | WEIGHT: 145 LBS | SYSTOLIC BLOOD PRESSURE: 132 MMHG | BODY MASS INDEX: 26.68 KG/M2

## 2020-02-06 PROCEDURE — G8427 DOCREV CUR MEDS BY ELIG CLIN: HCPCS | Performed by: SURGERY

## 2020-02-06 PROCEDURE — 1036F TOBACCO NON-USER: CPT | Performed by: SURGERY

## 2020-02-06 PROCEDURE — 3017F COLORECTAL CA SCREEN DOC REV: CPT | Performed by: SURGERY

## 2020-02-06 PROCEDURE — 1111F DSCHRG MED/CURRENT MED MERGE: CPT | Performed by: SURGERY

## 2020-02-06 PROCEDURE — G8484 FLU IMMUNIZE NO ADMIN: HCPCS | Performed by: SURGERY

## 2020-02-06 PROCEDURE — 99212 OFFICE O/P EST SF 10 MIN: CPT | Performed by: SURGERY

## 2020-02-06 PROCEDURE — G8419 CALC BMI OUT NRM PARAM NOF/U: HCPCS | Performed by: SURGERY

## 2020-02-06 NOTE — PROGRESS NOTES
Postop Progress Note    Subjective:  No abdominal pain, no blood per rectum, No black stool, no reflux since the procedure. Has been staying away from ibuprofen     Objective:   Physical Exam  HENT:      Head: Normocephalic. Eyes:      Extraocular Movements: Extraocular movements intact. Neck:      Musculoskeletal: Normal range of motion. Cardiovascular:      Rate and Rhythm: Normal rate. Pulmonary:      Effort: Pulmonary effort is normal.   Abdominal:      General: Abdomen is flat. There is no distension. Skin:     General: Skin is warm. Neurological:      General: No focal deficit present. Mental Status: She is alert.    Psychiatric:         Mood and Affect: Mood normal.         Assessment:  1/13 EGD -Esophagogastroduodenoscopy, Epinephrine injection, Endoclip application for  dieulafoy lesion     Plan:   Discussed staying on PPI and avoiding NSAIDs such as ibuprofen   Will repeat EGD in about ~8 weeks     Physician Signature: Electronically signed by Bhavani Gómez MD

## 2020-02-06 NOTE — PATIENT INSTRUCTIONS
Patient Information and Instructions for  Upper GI Endoscopy or Esophagogastroduodenoscopy [EGD])         Definition Upper GI Endoscopy or Esophagogastroduodenoscopy [EGD])  This is a test that uses a fiberoptic scope to examine the esophagus (throat), stomach, and upper part of the small intestines. Upper GI endoscopy may be recommended if you have:   Abdominal pain   Severe heartburn   Persistent nausea and vomiting   Difficulty swallowing   Blood in stool or vomit   Abnormal x-ray or other examinations of the gastrointestinal tract     Conditions that can be diagnosed with upper GI endoscopy include:   Ulcers   Tumors   Polyps   Abnormal narrowing   Inflammation     Possible Complications   Complications are rare, but no procedure is completely free of risk. If you are planning to have upper GI endoscopy, your doctor will review a list of possible complications, which may include:   Bleeding   Damage to the esophagus, stomach, or intestine   Infection   Respiratory depression (reduced breathing rate and/or depth)   Reaction to sedatives or anesthesia causing your blood pressure to drop    Some factors that may increase the risk of complications include:   Age: 61 or older   Pregnancy   Obesity   Smoking , alcoholism , or drug use   Malnutrition   Recent illness   Diabetes   Heart or lung problems   Bleeding disorders   Use of certain medicines     Be sure to discuss these risks with your doctor before the test.     What to Expect   Prior to test   Leading up to the test:   Arrange for a ride home after the test. Also, arrange for help at home. The night before, eat a light meal.  Do not eat or drink anything after midnight the night before the test.   Talk to your doctor about your medicines.  You may be asked to stop taking some medicines up to one week before the procedure, like:   Anti-inflammatory drugs (e.g., aspirin )   Blood thinners, like clopidogrel (Plavix) or warfarin (Coumadin)     Description of the Test   You will be asked to lie on your left side. You will have monitors tracking your breathing, heart rate, and blood oxygen levels. You will be given supplemental oxygen to breathe through your nose. A mouthpiece will be positioned to help keep your mouth open. Your throat may be sprayed with a numbing medicine. You will be given a sedative through an IV to help you relax during the test.  During the test, a small suction tube will be used to clear saliva and fluids from your mouth. The endoscope will be lubricated and placed in your mouth. You will be asked to try to swallow it. Then, it will be carefully and slowly advanced down your throat. It will be passed through your esophagus and into your stomach and intestine. While the endoscope is being advanced, your doctor will view the images on the screen. Air will be passed through the endoscope into your digestive tract. This will be done to smooth the normal folds in the tissues, allowing your doctor to view the tissue more easily. Tiny tools may be passed through the endoscope in order to take biopsies or do other tests. After Test   After the test, you will be observed for an hour. Then, you will be allowed to go home. When you return home after the test, do the following to help ensure a smooth recovery:   Rest when you get home. Ask your doctor if you can resume your normal diet. In most cases, you will be able to. Sedatives can slow your reaction time. Do not drive or use machinery for the rest of the day. Avoid alcohol for the rest of the day. Be sure to follow your doctor's instructions . How Long Will It Take? Usually about 10-15 minutes     Will It Hurt? Most people do not feel anything during the test and will not remember the test.  After the test, your throat may be sore and you may feel bloated. Results   This test gives your doctor information about the health of your digestive system.  The results can help to explain your symptoms. You and your doctor will talk about the results and your treatment plan. Call Your Doctor   After the test, call your doctor if any of the following occurs:   Signs of infection, including fever and chills   Severe abdominal pain   Hard, swollen abdomen   Difficulty swallowing or breathing   Any change or increase in your original symptoms   Bloody or black tarry colored stools   Nausea and/or vomiting   Cough, shortness of breath, or chest pain   Bleeding     In case of emergency, call 911. Please contact Catie Santiago MA at [phone] with any questions or concerns.

## 2020-02-19 ENCOUNTER — HOSPITAL ENCOUNTER (OUTPATIENT)
Age: 55
Discharge: HOME | End: 2020-02-21

## 2020-02-19 LAB
ABO/RH: NORMAL
ANTIBODY SCREEN: NORMAL

## 2020-02-19 PROCEDURE — 86900 BLOOD TYPING SEROLOGIC ABO: CPT

## 2020-02-19 PROCEDURE — 86850 RBC ANTIBODY SCREEN: CPT

## 2020-02-19 PROCEDURE — 86901 BLOOD TYPING SEROLOGIC RH(D): CPT

## 2020-02-19 PROCEDURE — 87081 CULTURE SCREEN ONLY: CPT

## 2020-02-21 LAB — MRSA CULTURE ONLY: NORMAL

## 2020-02-22 ENCOUNTER — HOSPITAL ENCOUNTER (OUTPATIENT)
Age: 55
Discharge: HOME | End: 2020-02-24

## 2020-02-22 LAB
ANION GAP SERPL CALCULATED.3IONS-SCNC: 13 MMOL/L (ref 7–16)
BUN BLDV-MCNC: 10 MG/DL (ref 6–20)
CALCIUM SERPL-MCNC: 9.7 MG/DL (ref 8.6–10.2)
CHLORIDE BLD-SCNC: 103 MMOL/L (ref 98–107)
CO2: 24 MMOL/L (ref 22–29)
CREAT SERPL-MCNC: 0.5 MG/DL (ref 0.5–1)
GFR AFRICAN AMERICAN: >60
GFR NON-AFRICAN AMERICAN: >60 ML/MIN/1.73
GLUCOSE BLD-MCNC: 143 MG/DL (ref 74–99)
HCT VFR BLD CALC: 38.6 % (ref 34–48)
HEMOGLOBIN: 12.6 G/DL (ref 11.5–15.5)
MCH RBC QN AUTO: 28.4 PG (ref 26–35)
MCHC RBC AUTO-ENTMCNC: 32.6 % (ref 32–34.5)
MCV RBC AUTO: 87.1 FL (ref 80–99.9)
PDW BLD-RTO: 13 FL (ref 11.5–15)
PLATELET # BLD: 403 E9/L (ref 130–450)
PMV BLD AUTO: 9.6 FL (ref 7–12)
POTASSIUM SERPL-SCNC: 3.9 MMOL/L (ref 3.5–5)
RBC # BLD: 4.43 E12/L (ref 3.5–5.5)
SODIUM BLD-SCNC: 140 MMOL/L (ref 132–146)
WBC # BLD: 10.9 E9/L (ref 4.5–11.5)

## 2020-02-22 PROCEDURE — 80048 BASIC METABOLIC PNL TOTAL CA: CPT

## 2020-02-22 PROCEDURE — 85027 COMPLETE CBC AUTOMATED: CPT

## 2020-03-20 ENCOUNTER — TELEPHONE (OUTPATIENT)
Dept: SURGERY | Age: 55
End: 2020-03-20

## 2020-03-20 NOTE — TELEPHONE ENCOUNTER
MA attempted to contact patient to reschedule patient's colonoscopy. Patient did not answer so MA left message requesting phone call back. Patient is to be asked if she is still experiencing blood in her stool, if so, a message should be routed to Dr Destin Mahoney for advisement if procedure should stay on schedule. Otherwise, the patient is to be rescheduled and informed to stay on PPI.      Electronically signed by Shilo Flores on 3/20/20 at 2:15 PM EDT

## 2020-03-23 ENCOUNTER — TELEPHONE (OUTPATIENT)
Dept: SURGERY | Age: 55
End: 2020-03-23

## 2020-03-23 NOTE — TELEPHONE ENCOUNTER
MA made second attempt to contact patient to reschedule appointment. Patient did not answer so MA left  requesting return call to reschedule.      Electronically signed by Breann Gutiérrez on 3/23/20 at 1:57 PM EDT

## 2020-03-24 ENCOUNTER — TELEPHONE (OUTPATIENT)
Dept: SURGERY | Age: 55
End: 2020-03-24

## 2020-05-06 ENCOUNTER — TELEPHONE (OUTPATIENT)
Dept: SURGERY | Age: 55
End: 2020-05-06

## 2020-05-22 ENCOUNTER — TELEPHONE (OUTPATIENT)
Dept: SURGERY | Age: 55
End: 2020-05-22

## 2020-05-22 NOTE — PROGRESS NOTES
[address] PRE-ADMISSION TESTING ENDOSCOPY INSTRUCTIONS- [facility]-phone number:[phone]    ENDOSCOPY INSTRUCTIONS:   [] Bowel prep instructions reviewed. [x] Nothing by mouth after midnight, including gum, candy, mints, or water. Please follow your surgeons instructions if you are required to complete a bowel prep. Colonoscopy- no solid food-only clear liquids the day prior). [x] You may brush your teeth, gargle, but do NOT swallow water. [x] Do not wear makeup, lotions, powders, deodorant. Nail polish as directed by the nurse. [x] Arrange transportation with a responsible adult  to and from the hospital. If you do not have a responsible adult  to transport you, you will need to make arrangements with a medical transportation company (i.e. [employer]. A Uber/taxi/bus is not appropriate unless you are accompanied by a responsible adult ). Arrange for someone to be with you for the remainder of the day and for 24 hours after your procedure due to having had anesthesia. Who will be your  for transportation?____husband______________   Who will be staying with you for 24 hrs after your procedure?___________husband_______    PARKING INSTRUCTIONS:   [x] Arrival Time:__________05/29/2020 0900     Main entrance______________  · [] Parking lot  \"I\" OR 1 is located on [facility] (the corner of [facility]). To enter, press the button and the gate will lift. A free token will be provided to exit the lot. One car per patient is allowed to park in this lot. All other cars are to park on [address] [place] [address] either in the parking garage or the handicap lot. [] To reach the [place] lobby from [address] [place] [address], upon entering the hospital, take elevator B to the 3rd floor.     EDUCATION INSTRUCTIONS:  [] Bring a complete list of your medications, please write the last time you took the medicine, give this list to the nurse.  [] Take the following medications the morning of surgery with 1-2 ounces of water:   [] Stop herbal supplements and vitamins 5 days before your surgery. [] DO NOT take any diabetic medicine the morning of surgery. Follow instructions for insulin the day before surgery. [] If you are diabetic and your blood sugar is low or you feel symptomatic, you may drink 1-2 ounces of apple juice or take a glucose tablet. The morning of your procedure, you may call the pre-op area if you have concerns about your blood sugar [phone]. [] Use your inhalers the morning of surgery. Bring your emergency inhaler with you day of surgery. [] Follow physician instructions regarding any blood thinners you may be taking. WHAT TO EXPECT:  [] The day of your procedure you will be greeted and checked in by the [facility].  In addition, you will be registered in the [facility] by a Patient Access Representative. Please bring your photo ID and insurance card. A nurse will greet you in accordance to the time you are needed in the pre-op area to prepare you for surgery. Please do not be discouraged if you are not greeted in the order you arrive as there are many variables that are involved in patient preparation. Your patience is greatly appreciated as you wait for your nurse. Please bring in items such as: books, magazines, newspapers, electronics, or any other items  to occupy your time in the waiting area. [x]  Delays may occur. Staff will make a sincere effort to keep you informed of delays. If any delays occur with your procedure, we apologize ahead of time for your inconvenience as we recognize the value of your time.

## 2020-05-26 ENCOUNTER — HOSPITAL ENCOUNTER (OUTPATIENT)
Age: 55
Discharge: HOME | End: 2020-05-28
Payer: COMMERCIAL

## 2020-05-26 PROCEDURE — U0003 INFECTIOUS AGENT DETECTION BY NUCLEIC ACID (DNA OR RNA); SEVERE ACUTE RESPIRATORY SYNDROME CORONAVIRUS 2 (SARS-COV-2) (CORONAVIRUS DISEASE [COVID-19]), AMPLIFIED PROBE TECHNIQUE, MAKING USE OF HIGH THROUGHPUT TECHNOLOGIES AS DESCRIBED BY CMS-2020-01-R: HCPCS

## 2020-05-27 LAB
SARS-COV-2: NOT DETECTED
SOURCE: NORMAL

## 2020-05-29 ENCOUNTER — ANESTHESIA EVENT (OUTPATIENT)
Dept: ENDOSCOPY | Age: 55
End: 2020-05-29
Payer: COMMERCIAL

## 2020-05-29 ENCOUNTER — ANESTHESIA (OUTPATIENT)
Dept: ENDOSCOPY | Age: 55
End: 2020-05-29
Payer: COMMERCIAL

## 2020-05-29 ENCOUNTER — HOSPITAL ENCOUNTER (OUTPATIENT)
Age: 55
Setting detail: OUTPATIENT SURGERY
Discharge: HOME | End: 2020-05-29
Attending: SURGERY | Admitting: SURGERY
Payer: COMMERCIAL

## 2020-05-29 VITALS
WEIGHT: 150 LBS | TEMPERATURE: 98.1 F | BODY MASS INDEX: 27.6 KG/M2 | SYSTOLIC BLOOD PRESSURE: 124 MMHG | HEIGHT: 62 IN | HEART RATE: 74 BPM | DIASTOLIC BLOOD PRESSURE: 76 MMHG | RESPIRATION RATE: 16 BRPM | OXYGEN SATURATION: 98 %

## 2020-05-29 VITALS
OXYGEN SATURATION: 94 % | RESPIRATION RATE: 10 BRPM | SYSTOLIC BLOOD PRESSURE: 135 MMHG | DIASTOLIC BLOOD PRESSURE: 69 MMHG

## 2020-05-29 PROCEDURE — 7100000010 HC PHASE II RECOVERY - FIRST 15 MIN: Performed by: SURGERY

## 2020-05-29 PROCEDURE — 3700000001 HC ADD 15 MINUTES (ANESTHESIA): Performed by: SURGERY

## 2020-05-29 PROCEDURE — 7100000011 HC PHASE II RECOVERY - ADDTL 15 MIN: Performed by: SURGERY

## 2020-05-29 PROCEDURE — 3609017100 HC EGD: Performed by: SURGERY

## 2020-05-29 PROCEDURE — 6360000002 HC RX W HCPCS

## 2020-05-29 PROCEDURE — 3700000000 HC ANESTHESIA ATTENDED CARE: Performed by: SURGERY

## 2020-05-29 PROCEDURE — 2580000003 HC RX 258: Performed by: SURGERY

## 2020-05-29 PROCEDURE — 2709999900 HC NON-CHARGEABLE SUPPLY: Performed by: SURGERY

## 2020-05-29 PROCEDURE — 43235 EGD DIAGNOSTIC BRUSH WASH: CPT | Performed by: SURGERY

## 2020-05-29 RX ORDER — MIDAZOLAM HYDROCHLORIDE 1 MG/ML
INJECTION INTRAMUSCULAR; INTRAVENOUS PRN
Status: DISCONTINUED | OUTPATIENT
Start: 2020-05-29 | End: 2020-05-29 | Stop reason: SDUPTHER

## 2020-05-29 RX ORDER — PROPOFOL 10 MG/ML
INJECTION, EMULSION INTRAVENOUS PRN
Status: DISCONTINUED | OUTPATIENT
Start: 2020-05-29 | End: 2020-05-29 | Stop reason: SDUPTHER

## 2020-05-29 RX ORDER — SODIUM CHLORIDE 9 MG/ML
INJECTION, SOLUTION INTRAVENOUS CONTINUOUS
Status: DISCONTINUED | OUTPATIENT
Start: 2020-05-29 | End: 2020-05-29 | Stop reason: HOSPADM

## 2020-05-29 RX ORDER — SODIUM CHLORIDE 0.9 % (FLUSH) 0.9 %
10 SYRINGE (ML) INJECTION PRN
Status: DISCONTINUED | OUTPATIENT
Start: 2020-05-29 | End: 2020-05-29 | Stop reason: HOSPADM

## 2020-05-29 RX ORDER — PANTOPRAZOLE SODIUM 40 MG/1
40 TABLET, DELAYED RELEASE ORAL
Qty: 60 TABLET | Refills: 3 | Status: SHIPPED | OUTPATIENT
Start: 2020-05-29 | End: 2021-04-26

## 2020-05-29 RX ORDER — SODIUM CHLORIDE 0.9 % (FLUSH) 0.9 %
10 SYRINGE (ML) INJECTION EVERY 12 HOURS SCHEDULED
Status: DISCONTINUED | OUTPATIENT
Start: 2020-05-29 | End: 2020-05-29 | Stop reason: HOSPADM

## 2020-05-29 RX ADMIN — PROPOFOL 20 MG: 10 INJECTION, EMULSION INTRAVENOUS at 10:30

## 2020-05-29 RX ADMIN — PROPOFOL 80 MG: 10 INJECTION, EMULSION INTRAVENOUS at 10:28

## 2020-05-29 RX ADMIN — PROPOFOL 20 MG: 10 INJECTION, EMULSION INTRAVENOUS at 10:34

## 2020-05-29 RX ADMIN — PROPOFOL 20 MG: 10 INJECTION, EMULSION INTRAVENOUS at 10:32

## 2020-05-29 RX ADMIN — MIDAZOLAM 2 MG: 1 INJECTION INTRAMUSCULAR; INTRAVENOUS at 10:27

## 2020-05-29 RX ADMIN — SODIUM CHLORIDE: 9 INJECTION, SOLUTION INTRAVENOUS at 09:40

## 2020-05-29 ASSESSMENT — PAIN SCALES - GENERAL
PAINLEVEL_OUTOF10: 0
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
## 2020-05-29 NOTE — PROGRESS NOTES
Dr. Brenda Griffith called. Patient needs a work excuse for after the covid test since self isolate . Work excuse given to patient. 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

## 2020-05-29 NOTE — PROGRESS NOTES
Patient admitted to Same Day Surgery. Pre-op instructions given.      Patient has completed COVID testing, has adhered to self-quarentine guidelines, and no shortness of breath, chest pain or flu-like symptoms

## 2020-05-29 NOTE — ANESTHESIA PRE PROCEDURE
Department of Anesthesiology  Preprocedure Note       Name:  Josy Feliciano   Age:  53 y.o.  :  1965                                          MRN:  [id number]         Date:  2020      Surgeon: Charlotte Whyte):  Michaelle Quijano MD    Procedure: Procedure(s):  EGD BIOPSY    Medications prior to admission:   Prior to Admission medications    Medication Sig Start Date End Date Taking? Authorizing Provider   pantoprazole (PROTONIX) 40 MG tablet Take 1 tablet by mouth 2 times daily (before meals) 20  Yes Britany Hardin MD   venlafaxine (EFFEXOR XR) 150 MG extended release capsule Take 150 mg by mouth daily 20  Yes Historical Provider, MD   fluticasone (FLONASE) 50 MCG/ACT nasal spray 1 spray by Each Nostril route nightly 1/10/20  Yes Historical Provider, MD   busPIRone (BUSPAR) 15 MG tablet Take 15 mg by mouth 2 times daily 20  Yes Historical Provider, MD   atorvastatin (LIPITOR) 20 MG tablet Take 20 mg by mouth daily 20  Yes Historical Provider, MD   vitamin D (ERGOCALCIFEROL) 1.25 MG (65363 UT) CAPS capsule Take 50,000 Units by mouth once a week 19   Historical Provider, MD   diclofenac (VOLTAREN) 75 MG EC tablet Take 75 mg by mouth 2 times daily 19   Historical Provider, MD       Current medications:    Current Facility-Administered Medications   Medication Dose Route Frequency Provider Last Rate Last Dose    0.9 % sodium chloride infusion   Intravenous Continuous Michaelle Quijano  mL/hr at 20 0940      sodium chloride flush 0.9 % injection 10 mL  10 mL Intravenous 2 times per day Michaelle Quijano MD        sodium chloride flush 0.9 % injection 10 mL  10 mL Intravenous PRN Michaelle Quijano MD           Allergies:     Allergies   Allergen Reactions    Penicillin G      Other reaction(s): Unknown, Urticaria    Pcn [Penicillins] Hives       Problem List:    Patient Active Problem List   Diagnosis Code    Hematemesis K92.0    Gastrointestinal hemorrhage K92.2       Past Medical History:  History reviewed. No pertinent past medical history. Past Surgical History:        Procedure Laterality Date     SECTION      x2    FOOT TENDON SURGERY Right     right shin nerve damage procedure at same time    UPPER GASTROINTESTINAL ENDOSCOPY N/A 2020    EGD ESOPHAGOGASTRODUODENOSCOPY performed by Ramana Simms MD at [facility] N/A 2020    EGD CONTROL HEMORRHAGE performed by Ramana Simms MD at [facility] History:    Social History     Tobacco Use    Smoking status: Former Smoker     Packs/day: 0.50    Smokeless tobacco: Never Used   Substance Use Topics    Alcohol use: Yes     Alcohol/week: 3.0 standard drinks     Types: 3 Glasses of wine per week     Frequency: Never     Comment: 3-4 nights a week - one glass                                 Counseling given: Not Answered      Vital Signs (Current):   Vitals:    20 1422 20 0915   BP:  134/61   Pulse:  73   Resp:  18   Temp:  36.3 °C (97.3 °F)   TempSrc:  Temporal   SpO2:  98%   Weight: 150 lb (68 kg) 150 lb (68 kg)   Height: 5' 2\" (1.575 m) 5' 2\" (1.575 m)                                              BP Readings from Last 3 Encounters:   20 134/61   20 132/68   20 138/66       NPO Status: Time of last liquid consumption:                         Time of last solid consumption:                         Date of last liquid consumption: 20                        Date of last solid food consumption: 20    BMI:   Wt Readings from Last 3 Encounters:   20 150 lb (68 kg)   20 145 lb (65.8 kg)   01/15/20 167 lb (75.8 kg)     Body mass index is 27.44 kg/m².     CBC:   Lab Results   Component Value Date    WBC 10.9 2020    RBC 4.43 2020    HGB 12.6 2020    HCT 38.6 2020    MCV 87.1 2020    RDW 13.0 2020     2020 CMP:   Lab Results   Component Value Date     02/22/2020    K 3.9 02/22/2020     02/22/2020    CO2 24 02/22/2020    BUN 10 02/22/2020    CREATININE 0.5 02/22/2020    GFRAA >60 02/22/2020    LABGLOM >60 02/22/2020    GLUCOSE 143 02/22/2020    PROT 5.3 01/17/2020    CALCIUM 9.7 02/22/2020    BILITOT 2.1 01/17/2020    ALKPHOS 403 01/17/2020     01/17/2020     01/17/2020       POC Tests: No results for input(s): POCGLU, POCNA, POCK, POCCL, POCBUN, POCHEMO, POCHCT in the last 72 hours. Coags:   Lab Results   Component Value Date    PROTIME 13.6 01/13/2020    INR 1.2 01/13/2020    APTT 27.6 01/13/2020       HCG (If Applicable): No results found for: PREGTESTUR, PREGSERUM, HCG, HCGQUANT     ABGs: No results found for: PHART, PO2ART, COF7VZM, ONI7ZHX, BEART, G0DMELRU     Type & Screen (If Applicable):  No results found for: LABABO, LABRH    Drug/Infectious Status (If Applicable):  No results found for: HIV, HEPCAB    COVID-19 Screening (If Applicable):   Lab Results   Component Value Date    COVID19 Not Detected 05/26/2020     EKG 01/13/20    Narrative & Impression     Sinus tachycardia  Nonspecific ST and T wave abnormality  Abnormal ECG  When compared with ECG of 06-JAN-2020 17:05,  Sinus tachycardia present              Anesthesia Evaluation  Patient summary reviewed and Nursing notes reviewed no history of anesthetic complications:   Airway: Mallampati: II  TM distance: >3 FB   Neck ROM: limited  Mouth opening: > = 3 FB Dental:          Pulmonary:Negative Pulmonary ROS breath sounds clear to auscultation                             Cardiovascular:    (+) hyperlipidemia      ECG reviewed  Rhythm: regular  Rate: normal           Beta Blocker:  Not on Beta Blocker         Neuro/Psych:   (+) depression/anxiety              ROS comment: Hx: Cervical spine surgery C5-C6 GI/Hepatic/Renal:   (+) GERD: no interval change,          ROS comment: Hx: GI bleed.    Endo/Other: ROS comment: Hx: vitamin D deficiency Abdominal:       Abdomen: soft. Vascular: negative vascular ROS. Anesthesia Plan      MAC     ASA 2       Induction: intravenous. Anesthetic plan and risks discussed with patient. Plan discussed with CRNA and attending. Lexie Joshi RN   5/29/2020      Pt seen, examined, chart reviewed, plan discussed.   Corin Nguyen  5/29/2020  10:55 AM

## 2020-06-23 ENCOUNTER — TELEPHONE (OUTPATIENT)
Dept: SURGERY | Age: 55
End: 2020-06-23

## 2020-06-23 NOTE — TELEPHONE ENCOUNTER
MA attempt to contact patient to discuss doing h-pylori stool sample. MA reached patient VM and left detailed message of why was calling and office number for patient to call office back and discuss details. Pt will need to come to office and get supplies. She will have to stop her PPI for 1 week prior to taking the sample.       Electronically signed by Chante Wilde MA on 6/23/20 at 2:07 PM EDT

## 2020-12-28 ENCOUNTER — HOSPITAL ENCOUNTER (OUTPATIENT)
Age: 55
Discharge: HOME | End: 2020-12-28
Payer: COMMERCIAL

## 2020-12-28 PROCEDURE — 87338 HPYLORI STOOL AG IA: CPT

## 2020-12-31 LAB — H PYLORI ANTIGEN STOOL: NEGATIVE

## 2021-01-04 ENCOUNTER — TELEPHONE (OUTPATIENT)
Dept: SURGERY | Age: 56
End: 2021-01-04

## 2021-01-04 NOTE — TELEPHONE ENCOUNTER
MA attempt to contact patient to relay results. MA reached patient VM and left detailed message that results of recent Hpylori test were negative and office number for patient to call office back if has any further questions.        Electronically signed by Emerita Camacho MA on 1/4/21 at 4:22 PM EST

## 2021-01-04 NOTE — TELEPHONE ENCOUNTER
----- Message from Vanesa Hadley MD sent at 1/4/2021  6:22 AM EST -----  Please call patient, patient of Dr. Lina Maria, and let her know that her stool for H pylori was negative.   Thank you  ----- Message -----  From: Barbie Ibarra Incoming Results From [facility]  Sent: 12/31/2020   1:16 AM EST  To: Felicity Lyle MD

## 2021-03-21 ENCOUNTER — APPOINTMENT (OUTPATIENT)
Dept: GENERAL RADIOLOGY | Age: 56
End: 2021-03-21
Payer: COMMERCIAL

## 2021-03-21 ENCOUNTER — APPOINTMENT (OUTPATIENT)
Dept: CT IMAGING | Age: 56
End: 2021-03-21
Payer: COMMERCIAL

## 2021-03-21 ENCOUNTER — HOSPITAL ENCOUNTER (OUTPATIENT)
Age: 56
Setting detail: OBSERVATION
Discharge: HOME | End: 2021-03-23
Attending: EMERGENCY MEDICINE | Admitting: INTERNAL MEDICINE
Payer: COMMERCIAL

## 2021-03-21 DIAGNOSIS — R55 SYNCOPE AND COLLAPSE: Primary | ICD-10-CM

## 2021-03-21 DIAGNOSIS — R93.89 ABNORMAL CT SCAN: ICD-10-CM

## 2021-03-21 DIAGNOSIS — R07.89 CHEST WALL PAIN: ICD-10-CM

## 2021-03-21 DIAGNOSIS — M79.601 RIGHT ARM PAIN: ICD-10-CM

## 2021-03-21 PROBLEM — K21.9 GERD (GASTROESOPHAGEAL REFLUX DISEASE): Status: ACTIVE | Noted: 2021-03-21

## 2021-03-21 PROBLEM — F32.A DEPRESSION: Status: ACTIVE | Noted: 2021-03-21

## 2021-03-21 PROBLEM — E78.5 HLD (HYPERLIPIDEMIA): Status: ACTIVE | Noted: 2021-03-21

## 2021-03-21 PROBLEM — E55.9 VITAMIN D DEFICIENCY: Status: ACTIVE | Noted: 2021-03-21

## 2021-03-21 LAB
ACETAMINOPHEN LEVEL: <5 MCG/ML (ref 10–30)
ALBUMIN SERPL-MCNC: 4.5 G/DL (ref 3.5–5.2)
ALP BLD-CCNC: 116 U/L (ref 35–104)
ALT SERPL-CCNC: 17 U/L (ref 0–32)
AMPHETAMINE SCREEN, URINE: NOT DETECTED
ANION GAP SERPL CALCULATED.3IONS-SCNC: 11 MMOL/L (ref 7–16)
AST SERPL-CCNC: 12 U/L (ref 0–31)
BACTERIA: ABNORMAL /HPF
BARBITURATE SCREEN URINE: NOT DETECTED
BASOPHILS ABSOLUTE: 0.05 E9/L (ref 0–0.2)
BASOPHILS RELATIVE PERCENT: 0.6 % (ref 0–2)
BENZODIAZEPINE SCREEN, URINE: NOT DETECTED
BILIRUB SERPL-MCNC: 0.3 MG/DL (ref 0–1.2)
BILIRUBIN URINE: NEGATIVE
BLOOD, URINE: NEGATIVE
BUN BLDV-MCNC: 12 MG/DL (ref 6–20)
CALCIUM SERPL-MCNC: 9.4 MG/DL (ref 8.6–10.2)
CANNABINOID SCREEN URINE: NOT DETECTED
CHLORIDE BLD-SCNC: 102 MMOL/L (ref 98–107)
CLARITY: NORMAL
CO2: 26 MMOL/L (ref 22–29)
COCAINE METABOLITE SCREEN URINE: NOT DETECTED
COLOR: YELLOW
CREAT SERPL-MCNC: 0.8 MG/DL (ref 0.5–1)
EOSINOPHILS ABSOLUTE: 0.08 E9/L (ref 0.05–0.5)
EOSINOPHILS RELATIVE PERCENT: 1 % (ref 0–6)
EPITHELIAL CELLS, UA: ABNORMAL /HPF
ETHANOL: <10 MG/DL (ref 0–0.08)
FENTANYL SCREEN, URINE: NOT DETECTED
GFR AFRICAN AMERICAN: >60
GFR NON-AFRICAN AMERICAN: >60 ML/MIN/1.73
GLUCOSE BLD-MCNC: 102 MG/DL (ref 74–99)
GLUCOSE URINE: NEGATIVE MG/DL
HCT VFR BLD CALC: 39.5 % (ref 34–48)
HEMOGLOBIN: 13.1 G/DL (ref 11.5–15.5)
IMMATURE GRANULOCYTES #: 0.05 E9/L
IMMATURE GRANULOCYTES %: 0.6 % (ref 0–5)
KETONES, URINE: NEGATIVE MG/DL
LEUKOCYTE ESTERASE, URINE: NEGATIVE
LYMPHOCYTES ABSOLUTE: 1.32 E9/L (ref 1.5–4)
LYMPHOCYTES RELATIVE PERCENT: 16.7 % (ref 20–42)
Lab: NORMAL
MCH RBC QN AUTO: 27 PG (ref 26–35)
MCHC RBC AUTO-ENTMCNC: 33.2 % (ref 32–34.5)
MCV RBC AUTO: 81.3 FL (ref 80–99.9)
METHADONE SCREEN, URINE: NOT DETECTED
MONOCYTES ABSOLUTE: 0.58 E9/L (ref 0.1–0.95)
MONOCYTES RELATIVE PERCENT: 7.4 % (ref 2–12)
NEUTROPHILS ABSOLUTE: 5.81 E9/L (ref 1.8–7.3)
NEUTROPHILS RELATIVE PERCENT: 73.7 % (ref 43–80)
NITRITE, URINE: NEGATIVE
OPIATE SCREEN URINE: NOT DETECTED
OXYCODONE URINE: NOT DETECTED
PDW BLD-RTO: 13.7 FL (ref 11.5–15)
PH UA: 5.5 (ref 5–9)
PHENCYCLIDINE SCREEN URINE: NOT DETECTED
PLATELET # BLD: 362 E9/L (ref 130–450)
PMV BLD AUTO: 9.4 FL (ref 7–12)
POTASSIUM REFLEX MAGNESIUM: 4.1 MMOL/L (ref 3.5–5)
PROTEIN UA: NEGATIVE MG/DL
RBC # BLD: 4.86 E12/L (ref 3.5–5.5)
RBC UA: ABNORMAL /HPF (ref 0–2)
SALICYLATE, SERUM: <0.3 MG/DL (ref 0–30)
SARS-COV-2, NAAT: NOT DETECTED
SODIUM BLD-SCNC: 139 MMOL/L (ref 132–146)
SPECIFIC GRAVITY UA: 1.02 (ref 1–1.03)
TOTAL PROTEIN: 7.1 G/DL (ref 6.4–8.3)
TRICYCLIC ANTIDEPRESSANTS SCREEN SERUM: NEGATIVE NG/ML
TROPONIN: <0.01 NG/ML (ref 0–0.03)
UROBILINOGEN, URINE: 0.2 E.U./DL
WBC # BLD: 7.9 E9/L (ref 4.5–11.5)
WBC UA: ABNORMAL /HPF (ref 0–5)

## 2021-03-21 PROCEDURE — 6360000002 HC RX W HCPCS: Performed by: EMERGENCY MEDICINE

## 2021-03-21 PROCEDURE — 99285 EMERGENCY DEPT VISIT HI MDM: CPT

## 2021-03-21 PROCEDURE — 72125 CT NECK SPINE W/O DYE: CPT

## 2021-03-21 PROCEDURE — 87635 SARS-COV-2 COVID-19 AMP PRB: CPT

## 2021-03-21 PROCEDURE — 80053 COMPREHEN METABOLIC PANEL: CPT

## 2021-03-21 PROCEDURE — 2580000003 HC RX 258: Performed by: RADIOLOGY

## 2021-03-21 PROCEDURE — 80143 DRUG ASSAY ACETAMINOPHEN: CPT

## 2021-03-21 PROCEDURE — 71275 CT ANGIOGRAPHY CHEST: CPT

## 2021-03-21 PROCEDURE — 2580000003 HC RX 258: Performed by: STUDENT IN AN ORGANIZED HEALTH CARE EDUCATION/TRAINING PROGRAM

## 2021-03-21 PROCEDURE — G0378 HOSPITAL OBSERVATION PER HR: HCPCS

## 2021-03-21 PROCEDURE — 93005 ELECTROCARDIOGRAM TRACING: CPT | Performed by: NURSE PRACTITIONER

## 2021-03-21 PROCEDURE — 82077 ASSAY SPEC XCP UR&BREATH IA: CPT

## 2021-03-21 PROCEDURE — 80179 DRUG ASSAY SALICYLATE: CPT

## 2021-03-21 PROCEDURE — 85025 COMPLETE CBC W/AUTO DIFF WBC: CPT

## 2021-03-21 PROCEDURE — 84484 ASSAY OF TROPONIN QUANT: CPT

## 2021-03-21 PROCEDURE — 70450 CT HEAD/BRAIN W/O DYE: CPT

## 2021-03-21 PROCEDURE — 80307 DRUG TEST PRSMV CHEM ANLYZR: CPT

## 2021-03-21 PROCEDURE — 73030 X-RAY EXAM OF SHOULDER: CPT

## 2021-03-21 PROCEDURE — 96374 THER/PROPH/DIAG INJ IV PUSH: CPT

## 2021-03-21 PROCEDURE — 71101 X-RAY EXAM UNILAT RIBS/CHEST: CPT

## 2021-03-21 PROCEDURE — 6360000004 HC RX CONTRAST MEDICATION: Performed by: RADIOLOGY

## 2021-03-21 PROCEDURE — 81001 URINALYSIS AUTO W/SCOPE: CPT

## 2021-03-21 RX ORDER — 0.9 % SODIUM CHLORIDE 0.9 %
1000 INTRAVENOUS SOLUTION INTRAVENOUS ONCE
Status: COMPLETED | OUTPATIENT
Start: 2021-03-21 | End: 2021-03-21

## 2021-03-21 RX ORDER — KETOROLAC TROMETHAMINE 30 MG/ML
30 INJECTION, SOLUTION INTRAMUSCULAR; INTRAVENOUS ONCE
Status: COMPLETED | OUTPATIENT
Start: 2021-03-21 | End: 2021-03-21

## 2021-03-21 RX ORDER — SODIUM CHLORIDE 0.9 % (FLUSH) 0.9 %
10 SYRINGE (ML) INJECTION ONCE
Status: COMPLETED | OUTPATIENT
Start: 2021-03-21 | End: 2021-03-21

## 2021-03-21 RX ADMIN — Medication 10 ML: at 21:30

## 2021-03-21 RX ADMIN — SODIUM CHLORIDE 1000 ML: 9 INJECTION, SOLUTION INTRAVENOUS at 18:11

## 2021-03-21 RX ADMIN — KETOROLAC TROMETHAMINE 30 MG: 30 INJECTION, SOLUTION INTRAMUSCULAR; INTRAVENOUS at 22:26

## 2021-03-21 RX ADMIN — IOPAMIDOL 75 ML: 755 INJECTION, SOLUTION INTRAVENOUS at 21:29

## 2021-03-21 ASSESSMENT — ENCOUNTER SYMPTOMS
COUGH: 0
CHEST TIGHTNESS: 1
EYE PAIN: 0
DIARRHEA: 0
SHORTNESS OF BREATH: 0
NAUSEA: 0
VOMITING: 0
ABDOMINAL PAIN: 0
WHEEZING: 0
RHINORRHEA: 0

## 2021-03-21 ASSESSMENT — PAIN DESCRIPTION - ORIENTATION: ORIENTATION: RIGHT

## 2021-03-21 ASSESSMENT — PAIN DESCRIPTION - LOCATION: LOCATION: SHOULDER;ARM;RIB CAGE

## 2021-03-21 NOTE — ED PROVIDER NOTES
Pt Name: Stew Villanueva  MRN: [id number]  [place] 1965  Date of evaluation: 3/21/2021      CHIEF COMPLAINT       Chief Complaint   Patient presents with    Loss of Consciousness     last night, became light headed and felt it coming on. Right arm pain and right rib pain. Stew Villanueva is a 63 y.o. female who presents to the ED by private car for syncopal episode last night at home. She also has complaints of arm and rib pain. Patient states she walked down her stairs last night and was standing in the kitchen for a few minutes when she syncopized. Next thing she remembers is she is on the floor. She has pain currently in her right lower aspect of her chest, right shoulder. Patient's complains have been constant without any alleviating factors and mild in severity. Pain is made worse with palpation of her right shoulder and right arm and right lower aspect of her chest.    HPI       Patient has a medical history as listed below:   History reviewed. No pertinent past medical history. Patient Active Problem List    Diagnosis Date Noted    Syncope and collapse 03/21/2021    HLD (hyperlipidemia) 03/21/2021    Depression 03/21/2021    GERD (gastroesophageal reflux disease) 03/21/2021    Vitamin D deficiency 03/21/2021    Gastrointestinal hemorrhage     Hematemesis 01/13/2020       Review of Systems   Constitutional: Negative for chills and fever. HENT: Negative for congestion and rhinorrhea. Eyes: Negative for pain and visual disturbance. Respiratory: Positive for chest tightness. Negative for cough, shortness of breath and wheezing. Cardiovascular: Positive for chest pain. Negative for palpitations and leg swelling. Gastrointestinal: Negative for abdominal pain, diarrhea, nausea and vomiting. Endocrine: Negative for polyuria. Genitourinary: Negative for dysuria, frequency and hematuria. Musculoskeletal: Positive for arthralgias (Right arm). Negative for myalgias. Skin: Negative for rash. Neurological: Positive for dizziness, syncope and light-headedness. Negative for weakness, numbness and headaches. Physical Exam  Vitals signs and nursing note reviewed. Constitutional:       Appearance: She is well-developed. HENT:      Head: Normocephalic and atraumatic. Nose: Nose normal.      Mouth/Throat:      Pharynx: Oropharynx is clear. Eyes:      Conjunctiva/sclera: Conjunctivae normal.      Pupils: Pupils are equal, round, and reactive to light. Neck:      Musculoskeletal: Normal range of motion. Cardiovascular:      Rate and Rhythm: Normal rate and regular rhythm. Pulses: Normal pulses. Heart sounds: Normal heart sounds. Pulmonary:      Effort: Pulmonary effort is normal. No respiratory distress. Breath sounds: Normal breath sounds. No wheezing, rhonchi or rales. Chest:       Abdominal:      General: Abdomen is flat. There is no distension. Palpations: Abdomen is soft. There is no mass. Tenderness: There is no abdominal tenderness. There is no guarding or rebound. Musculoskeletal:         General: No swelling. Right shoulder: She exhibits decreased range of motion, tenderness, bony tenderness, pain and decreased strength. She exhibits no swelling, no effusion, no crepitus, no deformity, no laceration, no spasm and normal pulse. Right elbow: Normal.She exhibits normal range of motion, no swelling, no effusion, no deformity and no laceration. No tenderness found. Right upper arm: She exhibits tenderness (Upper arm around right shoulder) and edema. She exhibits no bony tenderness, no swelling, no deformity and no laceration. Right hand: She exhibits normal range of motion, no tenderness, no bony tenderness, normal capillary refill, no deformity, no laceration and no swelling. Normal sensation noted. Normal strength noted. Right lower leg: No edema. Left lower leg: No edema.    Skin:     General: Skin is warm and dry. Neurological:      Mental Status: She is alert. Procedures     MDM     ED Course as of Mar 22 0112   Lorena Mercado Mar 21, 2021   5229 Spoke with  for Dr. Osvaldo Guadalupe, she agrees to admit the patient.    [WL]   Mon Mar 22, 2021   0110 EKG read by me. Normal sinus rhythm normal EKG. No STEMI.    [WL]      ED Course User Index  [WL] Prince Dias DO        Patient presents to the ED for evaluation. This patient was seen and evaluated with the attending. Work-up was performed with concerns for but not limited to ACS, arrhythmia, PE, Fracture, dislocation, strain, contusion, sprain, CVA, intracranial hemorrhage, mass and Electrolyte abnormality  ED exam shows concern of pneumonia in the right middle lobe. Patient has had trauma to this area and pain from syncopized and prior to arrival.  This moment we will treat with antibiotics as she has no shortness of breath coughing fever or relation to pneumonia. Consult placed to pulmonology for further investigation of the opacity seen in the right middle lobe of the lung upon admission. Plan be to admit to medicine for further work-up and management of her syncope. Patient requires continued workup and management of their symptoms and will be admitted to the hospital for further evaluation and treatment.        --------------------------------------------- PAST HISTORY ---------------------------------------------  Past Medical History:  has no past medical history on file. Past Surgical History:  has a past surgical history that includes Upper gastrointestinal endoscopy (N/A, 2020); Upper gastrointestinal endoscopy (N/A, 2020); Foot Tendon Surgery (Right);  section; and Upper gastrointestinal endoscopy (N/A, 2020). Social History:  reports that she has quit smoking. She smoked 0.50 packs per day.  She has never used smokeless tobacco. She reports current alcohol use of about 3.0 standard drinks of alcohol per week. She reports that she does not use drugs. Family History: family history includes Cancer in her father; High Blood Pressure in her mother. The patients home medications have been reviewed.     Allergies: Penicillin g and Pcn [penicillins]    -------------------------------------------------- RESULTS -------------------------------------------------    LABS:  Results for orders placed or performed during the hospital encounter of 03/21/21   COVID-19, Rapid    Specimen: Nasopharyngeal Swab   Result Value Ref Range    SARS-CoV-2, NAAT Not Detected Not Detected   CBC Auto Differential   Result Value Ref Range    WBC 7.9 4.5 - 11.5 E9/L    RBC 4.86 3.50 - 5.50 E12/L    Hemoglobin 13.1 11.5 - 15.5 g/dL    Hematocrit 39.5 34.0 - 48.0 %    MCV 81.3 80.0 - 99.9 fL    MCH 27.0 26.0 - 35.0 pg    MCHC 33.2 32.0 - 34.5 %    RDW 13.7 11.5 - 15.0 fL    Platelets 953 565 - 307 E9/L    MPV 9.4 7.0 - 12.0 fL    Neutrophils % 73.7 43.0 - 80.0 %    Immature Granulocytes % 0.6 0.0 - 5.0 %    Lymphocytes % 16.7 (L) 20.0 - 42.0 %    Monocytes % 7.4 2.0 - 12.0 %    Eosinophils % 1.0 0.0 - 6.0 %    Basophils % 0.6 0.0 - 2.0 %    Neutrophils Absolute 5.81 1.80 - 7.30 E9/L    Immature Granulocytes # 0.05 E9/L    Lymphocytes Absolute 1.32 (L) 1.50 - 4.00 E9/L    Monocytes Absolute 0.58 0.10 - 0.95 E9/L    Eosinophils Absolute 0.08 0.05 - 0.50 E9/L    Basophils Absolute 0.05 0.00 - 0.20 E9/L   Comprehensive Metabolic Panel w/ Reflex to MG   Result Value Ref Range    Sodium 139 132 - 146 mmol/L    Potassium reflex Magnesium 4.1 3.5 - 5.0 mmol/L    Chloride 102 98 - 107 mmol/L    CO2 26 22 - 29 mmol/L    Anion Gap 11 7 - 16 mmol/L    Glucose 102 (H) 74 - 99 mg/dL    BUN 12 6 - 20 mg/dL    CREATININE 0.8 0.5 - 1.0 mg/dL    GFR Non-African American >60 >=60 mL/min/1.73    GFR African American >60     Calcium 9.4 8.6 - 10.2 mg/dL    Total Protein 7.1 6.4 - 8.3 g/dL    Albumin 4.5 3.5 - 5.2 g/dL    Total Bilirubin 0.3 0.0 - 1.2 mg/dL    Alkaline Phosphatase 116 (H) 35 - 104 U/L    ALT 17 0 - 32 U/L    AST 12 0 - 31 U/L   Troponin   Result Value Ref Range    Troponin <0.01 0.00 - 0.03 ng/mL   Serum Drug Screen   Result Value Ref Range    Ethanol Lvl <10 mg/dL    Acetaminophen Level <5.0 (L) 10.0 - 53.8 mcg/mL    Salicylate, Serum <8.2 0.0 - 30.0 mg/dL    TCA Scrn NEGATIVE Cutoff:300 ng/mL   Urine Drug Screen   Result Value Ref Range    Amphetamine Screen, Urine NOT DETECTED Negative <1000 ng/mL    Barbiturate Screen, Ur NOT DETECTED Negative < 200 ng/mL    Benzodiazepine Screen, Urine NOT DETECTED Negative < 200 ng/mL    Cannabinoid Scrn, Ur NOT DETECTED Negative < 50ng/mL    Cocaine Metabolite Screen, Urine NOT DETECTED Negative < 300 ng/mL    Opiate Scrn, Ur NOT DETECTED Negative < 300ng/mL    PCP Screen, Urine NOT DETECTED Negative < 25 ng/mL    Methadone Screen, Urine NOT DETECTED Negative <300 ng/mL    Oxycodone Urine NOT DETECTED Negative <100 ng/mL    FENTANYL SCREEN, URINE NOT DETECTED Negative <1 ng/mL    Drug Screen Comment: see below    Urinalysis   Result Value Ref Range    Color, UA Yellow Straw/Yellow    Clarity, UA SL CLOUDY Clear    Glucose, Ur Negative Negative mg/dL    Bilirubin Urine Negative Negative    Ketones, Urine Negative Negative mg/dL    Specific Gravity, UA 1.025 1.005 - 1.030    Blood, Urine Negative Negative    pH, UA 5.5 5.0 - 9.0    Protein, UA Negative Negative mg/dL    Urobilinogen, Urine 0.2 <2.0 E.U./dL    Nitrite, Urine Negative Negative    Leukocyte Esterase, Urine Negative Negative   Microscopic Urinalysis   Result Value Ref Range    WBC, UA NONE 0 - 5 /HPF    RBC, UA NONE 0 - 2 /HPF    Epithelial Cells, UA FEW /HPF    Bacteria, UA MODERATE (A) None Seen /HPF       RADIOLOGY:  CTA PULMONARY W CONTRAST   Final Result   No central pulmonary embolism or aortic dissection. Atelectasis lung bases and ground-glass opacities in the right middle lobe.    Pneumonia including viral infection has to be excluded. CT HEAD WO CONTRAST   Final Result   CT head without contrast:      1. No skull fracture or acute intracranial abnormality. CT cervical spine without contrast:      1. No fracture or joint dislocation is seen. 2. Degenerative and postoperative changes, as described. CT CERVICAL SPINE WO CONTRAST   Final Result   CT head without contrast:      1. No skull fracture or acute intracranial abnormality. CT cervical spine without contrast:      1. No fracture or joint dislocation is seen. 2. Degenerative and postoperative changes, as described. XR SHOULDER RIGHT (MIN 2 VIEWS)   Final Result   No acute bony abnormality seen. Mild subsegmental atelectasis vs early infiltrate along the right lung base. XR RIBS RIGHT INCLUDE CHEST (MIN 3 VIEWS)   Final Result   Mild right basilar discoid atelectasis or scarring which is more prominent   otherwise, unremarkable. Specifically, no rib fracture seen. ------------------------- NURSING NOTES AND VITALS REVIEWED ---------------------------  Date / Time Roomed:  3/21/2021  3:44 PM  ED Bed Assignment:  07/07    The nursing notes within the ED encounter and vital signs as below have been reviewed.      Patient Vitals for the past 24 hrs:   BP Temp Temp src Pulse Resp SpO2 Height Weight   03/22/21 0046 (!) 157/72 97.3 °F (36.3 °C) Temporal 60 17 98 % -- --   03/21/21 2236 129/84 97.1 °F (36.2 °C) -- 74 -- -- -- --   03/21/21 2013 130/76 98.1 °F (36.7 °C) -- 70 -- -- -- --   03/21/21 1900 122/80 98.2 °F (36.8 °C) -- 74 17 98 % -- --   03/21/21 1705 131/87 97.8 °F (36.6 °C) -- 72 16 98 % -- --   03/21/21 1630 129/72 97.4 °F (36.3 °C) -- 78 17 98 % -- --   03/21/21 1504 130/65 -- -- 77 16 97 % 5' 2\" (1.575 m) 150 lb (68 kg)   03/21/21 1446 -- 96.9 °F (36.1 °C) -- 75 -- 95 % -- --           Counseling:  I have spoken with the patient/family members and discussed todays results, in addition to providing specific details for the plan of care and counseling regarding the diagnosis and prognosis. Their questions are answered at this time and they are agreeable with the plan of admission. This patient has remained hemodynamically stable during their ED course. Diagnosis:  1. Syncope and collapse    2. Abnormal CT scan    3. Chest wall pain    4. Right arm pain        Disposition:  Patient's disposition: Admit  Patient's condition is stable. NOTE:  This report was transcribed using voice recognition software. Efforts were made to ensure accuracy; however, inadvertent computerized transcription errors may be present.            Cassie Hernandez DO  Resident  03/22/21 6161

## 2021-03-22 LAB
ANION GAP SERPL CALCULATED.3IONS-SCNC: 9 MMOL/L (ref 7–16)
BUN BLDV-MCNC: 14 MG/DL (ref 6–20)
CALCIUM SERPL-MCNC: 9.3 MG/DL (ref 8.6–10.2)
CHLORIDE BLD-SCNC: 107 MMOL/L (ref 98–107)
CO2: 27 MMOL/L (ref 22–29)
CREAT SERPL-MCNC: 0.8 MG/DL (ref 0.5–1)
EKG ATRIAL RATE: 71 BPM
EKG P AXIS: 73 DEGREES
EKG P-R INTERVAL: 156 MS
EKG Q-T INTERVAL: 392 MS
EKG QRS DURATION: 86 MS
EKG QTC CALCULATION (BAZETT): 425 MS
EKG R AXIS: 79 DEGREES
EKG T AXIS: 34 DEGREES
EKG VENTRICULAR RATE: 71 BPM
GFR AFRICAN AMERICAN: >60
GFR NON-AFRICAN AMERICAN: >60 ML/MIN/1.73
GLUCOSE BLD-MCNC: 96 MG/DL (ref 74–99)
HCT VFR BLD CALC: 36.5 % (ref 34–48)
HEMOGLOBIN: 12 G/DL (ref 11.5–15.5)
MCH RBC QN AUTO: 27.1 PG (ref 26–35)
MCHC RBC AUTO-ENTMCNC: 32.9 % (ref 32–34.5)
MCV RBC AUTO: 82.4 FL (ref 80–99.9)
PDW BLD-RTO: 13.7 FL (ref 11.5–15)
PLATELET # BLD: 325 E9/L (ref 130–450)
PMV BLD AUTO: 9.7 FL (ref 7–12)
POTASSIUM REFLEX MAGNESIUM: 3.8 MMOL/L (ref 3.5–5)
RBC # BLD: 4.43 E12/L (ref 3.5–5.5)
SODIUM BLD-SCNC: 143 MMOL/L (ref 132–146)
WBC # BLD: 6 E9/L (ref 4.5–11.5)

## 2021-03-22 PROCEDURE — 85027 COMPLETE CBC AUTOMATED: CPT

## 2021-03-22 PROCEDURE — 6370000000 HC RX 637 (ALT 250 FOR IP): Performed by: NURSE PRACTITIONER

## 2021-03-22 PROCEDURE — G0378 HOSPITAL OBSERVATION PER HR: HCPCS

## 2021-03-22 PROCEDURE — 80048 BASIC METABOLIC PNL TOTAL CA: CPT

## 2021-03-22 PROCEDURE — 93010 ELECTROCARDIOGRAM REPORT: CPT | Performed by: INTERNAL MEDICINE

## 2021-03-22 PROCEDURE — 99220 PR INITIAL OBSERVATION CARE/DAY 70 MINUTES: CPT | Performed by: INTERNAL MEDICINE

## 2021-03-22 PROCEDURE — 97161 PT EVAL LOW COMPLEX 20 MIN: CPT

## 2021-03-22 PROCEDURE — 96376 TX/PRO/DX INJ SAME DRUG ADON: CPT

## 2021-03-22 PROCEDURE — 36415 COLL VENOUS BLD VENIPUNCTURE: CPT

## 2021-03-22 PROCEDURE — 6360000002 HC RX W HCPCS: Performed by: INTERNAL MEDICINE

## 2021-03-22 PROCEDURE — 2580000003 HC RX 258: Performed by: NURSE PRACTITIONER

## 2021-03-22 RX ORDER — ACETAMINOPHEN 325 MG/1
650 TABLET ORAL EVERY 6 HOURS PRN
Status: DISCONTINUED | OUTPATIENT
Start: 2021-03-22 | End: 2021-03-23 | Stop reason: HOSPADM

## 2021-03-22 RX ORDER — VENLAFAXINE HYDROCHLORIDE 150 MG/1
150 CAPSULE, EXTENDED RELEASE ORAL DAILY
Status: DISCONTINUED | OUTPATIENT
Start: 2021-03-22 | End: 2021-03-23 | Stop reason: HOSPADM

## 2021-03-22 RX ORDER — ACETAMINOPHEN 650 MG/1
650 SUPPOSITORY RECTAL EVERY 6 HOURS PRN
Status: DISCONTINUED | OUTPATIENT
Start: 2021-03-22 | End: 2021-03-23 | Stop reason: HOSPADM

## 2021-03-22 RX ORDER — PANTOPRAZOLE SODIUM 40 MG/1
40 TABLET, DELAYED RELEASE ORAL
Status: DISCONTINUED | OUTPATIENT
Start: 2021-03-22 | End: 2021-03-23 | Stop reason: HOSPADM

## 2021-03-22 RX ORDER — ATORVASTATIN CALCIUM 20 MG/1
20 TABLET, FILM COATED ORAL NIGHTLY
Status: DISCONTINUED | OUTPATIENT
Start: 2021-03-22 | End: 2021-03-23 | Stop reason: HOSPADM

## 2021-03-22 RX ORDER — POLYETHYLENE GLYCOL 3350 17 G/17G
17 POWDER, FOR SOLUTION ORAL DAILY PRN
Status: DISCONTINUED | OUTPATIENT
Start: 2021-03-22 | End: 2021-03-23 | Stop reason: HOSPADM

## 2021-03-22 RX ORDER — ERGOCALCIFEROL 1.25 MG/1
50000 CAPSULE ORAL WEEKLY
Status: DISCONTINUED | OUTPATIENT
Start: 2021-03-28 | End: 2021-03-23 | Stop reason: HOSPADM

## 2021-03-22 RX ORDER — SODIUM CHLORIDE 9 MG/ML
INJECTION, SOLUTION INTRAVENOUS CONTINUOUS
Status: DISCONTINUED | OUTPATIENT
Start: 2021-03-22 | End: 2021-03-22

## 2021-03-22 RX ORDER — TRAZODONE HYDROCHLORIDE 50 MG/1
25 TABLET ORAL NIGHTLY
Status: DISCONTINUED | OUTPATIENT
Start: 2021-03-22 | End: 2021-03-23 | Stop reason: HOSPADM

## 2021-03-22 RX ORDER — KETOROLAC TROMETHAMINE 30 MG/ML
15 INJECTION, SOLUTION INTRAMUSCULAR; INTRAVENOUS EVERY 6 HOURS PRN
Status: DISCONTINUED | OUTPATIENT
Start: 2021-03-22 | End: 2021-03-23 | Stop reason: HOSPADM

## 2021-03-22 RX ORDER — SODIUM CHLORIDE 0.9 % (FLUSH) 0.9 %
10 SYRINGE (ML) INJECTION EVERY 12 HOURS SCHEDULED
Status: DISCONTINUED | OUTPATIENT
Start: 2021-03-22 | End: 2021-03-23 | Stop reason: HOSPADM

## 2021-03-22 RX ORDER — SODIUM CHLORIDE 0.9 % (FLUSH) 0.9 %
10 SYRINGE (ML) INJECTION PRN
Status: DISCONTINUED | OUTPATIENT
Start: 2021-03-22 | End: 2021-03-23 | Stop reason: HOSPADM

## 2021-03-22 RX ORDER — FLUTICASONE PROPIONATE 50 MCG
1 SPRAY, SUSPENSION (ML) NASAL NIGHTLY
Status: DISCONTINUED | OUTPATIENT
Start: 2021-03-22 | End: 2021-03-23 | Stop reason: HOSPADM

## 2021-03-22 RX ADMIN — ATORVASTATIN CALCIUM 20 MG: 20 TABLET, FILM COATED ORAL at 20:30

## 2021-03-22 RX ADMIN — ACETAMINOPHEN 650 MG: 325 TABLET ORAL at 01:53

## 2021-03-22 RX ADMIN — SODIUM CHLORIDE: 9 INJECTION, SOLUTION INTRAVENOUS at 05:18

## 2021-03-22 RX ADMIN — PANTOPRAZOLE SODIUM 40 MG: 40 TABLET, DELAYED RELEASE ORAL at 17:15

## 2021-03-22 RX ADMIN — BUSPIRONE HYDROCHLORIDE 15 MG: 10 TABLET ORAL at 20:30

## 2021-03-22 RX ADMIN — KETOROLAC TROMETHAMINE 15 MG: 30 INJECTION, SOLUTION INTRAMUSCULAR; INTRAVENOUS at 12:03

## 2021-03-22 RX ADMIN — PANTOPRAZOLE SODIUM 40 MG: 40 TABLET, DELAYED RELEASE ORAL at 08:26

## 2021-03-22 RX ADMIN — KETOROLAC TROMETHAMINE 15 MG: 30 INJECTION, SOLUTION INTRAMUSCULAR; INTRAVENOUS at 20:31

## 2021-03-22 RX ADMIN — ACETAMINOPHEN 650 MG: 325 TABLET ORAL at 08:26

## 2021-03-22 RX ADMIN — BUSPIRONE HYDROCHLORIDE 15 MG: 10 TABLET ORAL at 08:26

## 2021-03-22 RX ADMIN — Medication 10 ML: at 20:30

## 2021-03-22 RX ADMIN — VENLAFAXINE HYDROCHLORIDE 150 MG: 150 CAPSULE, EXTENDED RELEASE ORAL at 08:26

## 2021-03-22 RX ADMIN — TRAZODONE HYDROCHLORIDE 25 MG: 50 TABLET ORAL at 20:30

## 2021-03-22 ASSESSMENT — PAIN DESCRIPTION - PAIN TYPE
TYPE: ACUTE PAIN

## 2021-03-22 ASSESSMENT — PAIN SCALES - GENERAL
PAINLEVEL_OUTOF10: 0
PAINLEVEL_OUTOF10: 8
PAINLEVEL_OUTOF10: 10
PAINLEVEL_OUTOF10: 10
PAINLEVEL_OUTOF10: 5
PAINLEVEL_OUTOF10: 2
PAINLEVEL_OUTOF10: 5
PAINLEVEL_OUTOF10: 4

## 2021-03-22 ASSESSMENT — PAIN DESCRIPTION - DESCRIPTORS
DESCRIPTORS: ACHING
DESCRIPTORS: ACHING;DISCOMFORT;SHARP
DESCRIPTORS: ACHING
DESCRIPTORS: ACHING

## 2021-03-22 ASSESSMENT — PAIN - FUNCTIONAL ASSESSMENT: PAIN_FUNCTIONAL_ASSESSMENT: ACTIVITIES ARE NOT PREVENTED

## 2021-03-22 ASSESSMENT — PAIN DESCRIPTION - ONSET
ONSET: ON-GOING
ONSET: GRADUAL

## 2021-03-22 ASSESSMENT — PAIN DESCRIPTION - ORIENTATION
ORIENTATION: RIGHT
ORIENTATION: RIGHT

## 2021-03-22 ASSESSMENT — PAIN DESCRIPTION - LOCATION
LOCATION: SHOULDER;ARM;RIB CAGE
LOCATION: SHOULDER;RIB CAGE
LOCATION: SHOULDER

## 2021-03-22 ASSESSMENT — PAIN DESCRIPTION - FREQUENCY
FREQUENCY: CONTINUOUS
FREQUENCY: CONTINUOUS

## 2021-03-22 NOTE — CONSULTS
[facility] OR    UPPER GASTROINTESTINAL ENDOSCOPY N/A 5/29/2020    EGD DIAGNOSTIC ONLY performed by Felicity Lyle MD at [facility] ENDOSCOPY       FAMILY HISTORY:   Family History   Problem Relation Age of Onset    High Blood Pressure Mother     Cancer Father         prostate       SOCIAL HISTORY:   Social History     Socioeconomic History    Marital status:      Spouse name: Not on file    Number of children: Not on file    Years of education: Not on file    Highest education level: Not on file   Occupational History    Not on file   Social Needs    Financial resource strain: Not on file    Food insecurity     Worry: Not on file     Inability: Not on file    Transportation needs     Medical: Not on file     Non-medical: Not on file   Tobacco Use    Smoking status: Former Smoker     Packs/day: 0.50    Smokeless tobacco: Never Used   Substance and Sexual Activity    Alcohol use:  Yes     Alcohol/week: 3.0 standard drinks     Types: 3 Glasses of wine per week     Frequency: Never     Comment: 3-4 nights a week - one glass     Drug use: No    Sexual activity: Not on file   Lifestyle    Physical activity     Days per week: Not on file     Minutes per session: Not on file    Stress: Not on file   Relationships    Social connections     Talks on phone: Not on file     Gets together: Not on file     Attends Faith service: Not on file     Active member of club or organization: Not on file     Attends meetings of clubs or organizations: Not on file     Relationship status: Not on file    Intimate partner violence     Fear of current or ex partner: Not on file     Emotionally abused: Not on file     Physically abused: Not on file     Forced sexual activity: Not on file   Other Topics Concern    Not on file   Social History Narrative    Not on file     Social History     Tobacco Use   Smoking Status Former Smoker    Packs/day: 0.50   Smokeless Tobacco Never Used     Social History     Substance Summary (Last 24 hours) at 3/22/2021 1118  Last data filed at 3/22/2021 0130  Gross per 24 hour   Intake 250 ml   Output --   Net 250 ml       General Appearance: alert and oriented to person, place and time, well-developed and   well-nourished, in no acute distress   Eyes: pupils equal, round, and reactive to light, extraocular eye movements intact, conjunctivae normal and sclera anicteric   Neck: neck supple and non tender without mass, no thyromegaly, no thyroid nodules and no cervical adenopathy   Pulmonary/Chest:scattred rhonchi   Cardiovascular: normal rate, regular rhythm, normal S1 and S2, no murmurs, rubs, clicks or gallops, distal pulses intact, no carotid bruits, no murmurs, no gallops, no carotid bruits and no JVD   Abdomen: obese, soft, non-tender, non-distended, normal bowel sounds, no masses or organomegaly   Extremities:no edema   Musculoskeletal: normal range of motion, no joint swelling, deformity or tenderness   Neurologic: reflexes normal and symmetric, no cranial nerve deficit noted    LABS/IMAGING:    CBC:  Lab Results   Component Value Date    WBC 6.0 03/22/2021    HGB 12.0 03/22/2021    HCT 36.5 03/22/2021    MCV 82.4 03/22/2021     03/22/2021    LYMPHOPCT 16.7 (L) 03/21/2021    RBC 4.43 03/22/2021    MCH 27.1 03/22/2021    MCHC 32.9 03/22/2021    RDW 13.7 03/22/2021    NEUTOPHILPCT 73.7 03/21/2021    MONOPCT 7.4 03/21/2021    BASOPCT 0.6 03/21/2021    NEUTROABS 5.81 03/21/2021    LYMPHSABS 1.32 (L) 03/21/2021    MONOSABS 0.58 03/21/2021    EOSABS 0.08 03/21/2021    BASOSABS 0.05 03/21/2021       Recent Labs     03/22/21  0505 03/21/21  1516   WBC 6.0 7.9   HGB 12.0 13.1   HCT 36.5 39.5   MCV 82.4 81.3    362       BMP:   Recent Labs     03/21/21  1516 03/22/21  0505    143   K 4.1 3.8    107   CO2 26 27   BUN 12 14   CREATININE 0.8 0.8       MG:   Lab Results   Component Value Date    MG 1.9 01/15/2020     Ca/Phos:   Lab Results   Component Value Date    CALCIUM 9.3 03/22/2021    PHOS 1.9 (L) 01/15/2020     Amylase: No results found for: AMYLASE  Lipase:   Lab Results   Component Value Date    LIPASE 25 01/06/2020     LIVER PROFILE:   Recent Labs     03/21/21  1516   AST 12   ALT 17   BILITOT 0.3   ALKPHOS 116*       PT/INR: No results for input(s): PROTIME, INR in the last 72 hours. APTT: No results for input(s): APTT in the last 72 hours.     Cardiac Enzymes:  Lab Results   Component Value Date    TROPONINI <0.01 03/21/2021       Hgb A1C: No results found for: LABA1C  No results found for: EAG  KOREY: No results found for: KOREY  ESR: No results found for: SEDRATE  CRP: No results found for: CRP  D Dimer: No results found for: DDIMER  Folate and B12: No results found for: [id number], No results found for: FOLATE    atelectasis lower lobes           PROBLEM LIST:  Patient Active Problem List   Diagnosis    Hematemesis    Gastrointestinal hemorrhage    Syncope and collapse    HLD (hyperlipidemia)    Depression    GERD (gastroesophageal reflux disease)    Vitamin D deficiency               ASSESSMENT:  1.) Possible COPD  2.)Atelectasis   3.)syncope        PLAN:  *-I am not sure she has mass ,but she has atelectasis and some GGO that need for sure follow up CT in 3 months     *-Aggressive pulmonary toilet   *-Ambulate before discharge and check Sat   *-pulse OX   *_PFT and follow up as OP  -check procalcitoninin and CRP  _ ok for discharge and follow up in 3 months with CT scan      Thank you very much for allowing me to participate in the care of this pleasant patient , should you have any questions ,please do not hesitate to contact me    [address] [facility]riendsuranceA[facility]

## 2021-03-22 NOTE — PROGRESS NOTES
Physical Therapy  Physical Therapy Initial Assessment     Name: Bin Nelson  : 1965  MRN: [id number]    Referring Provider: JUANITO Kennedy CNP    Date of Service: 3/22/2021    Evaluating PT: Harini Phan PT, DPT, [id number]    Room #: 9654/4662-I  Diagnosis: Syncope and collapse  Precautions: Fall risk    SUBJECTIVE:    Pt lives with  in a 2 story house with 2 stair(s) and 1 rail(s) to enter. Bed is on second floor and bath is on second floor. 14 stairs and 1 rail to second floor. Pt ambulated without AD prior to admission. Pt works as a . OBJECTIVE:   Initial Evaluation  Date: 3/22/21 Treatment Date: Short Term/ Long Term   Goals   AM-PAC 6 Clicks 72/15     Was pt agreeable to Eval/treatment? Yes     Does pt have pain? 8/10 R shoulder, rib, and flank pain; recently medicated     Bed Mobility  Rolling: NT  Supine to sit: Independent   Sit to supine: Independent   Scooting: Independent   NA   Transfers Sit to stand: Supervision  Stand to sit: Supervision  Stand pivot: SBA without AD  Sit to stand: Independent   Stand to sit: Independent   Stand pivot: Independent    Ambulation   75 feet without AD with SBA  200 feet Independent    Stair negotiation: ascended and descended NT  >14 step(s) with 1 rail(s) Mod Independent    ROM BUE: WFL  BLE: WFL     Strength BUE: WFL  BLE: WFL     Balance Sitting EOB: Independent   Dynamic Standing: SBA without AD  Dynamic Standing: Independent      Pt is A & O x: 4 to person, place, month/year, and situation. Sensation: Pt denies numbness and tingling of extremities. Edema: Unremarkable. Patient education  Pt educated on PT role in acute care setting. Patient response to education:   Pt verbalized understanding Pt demonstrated skill Pt requires further education in this area   Yes NA No     ASSESSMENT:    Comments:   Pt was in bed upon room entry, agreeable to PT evaluation. Pt reported R sided pain as primary complaint.  Pt ambulates with slow, guarded gait. Pt reached for environment for support at times. Pt reports \"my legs feel like rubber\". Steadiness and gait speed improved as session continued. Pt ambulated back to EOB where she was seated. Pt was educated on fall prevention strategies. Pt was left seated at EOB with all needs met at conclusion of session. Treatment:  Patient practiced and was instructed in the following treatment:     Therapeutic activities: Pt completed all therapeutic activities noted above. Pt's/family goals:   1. To return home. Patient and or family understand(s) diagnosis, prognosis, and plan of care. Yes. PLAN:    Current Treatment Recommendations   [x] Strengthening     [] ROM   [x] Balance Training   [x] Endurance Training   [x] Transfer Training   [x] Gait Training   [x] Stair Training   [] Positioning   [x] Safety and Education Training   [] Patient/Caregiver Education   [] HEP  [] Other     PT care will be provided in accordance with the objectives noted above. Exercises and functional mobility practice will be used as well as appropriate assistive devices or modalities to obtain goals. Patient and family education will also be administered as needed. Frequency of treatments: 2-5x/week x 1-2 days. Time in: 1310  Time out: 1325    Total Treatment Time 0 minutes     Evaluation Time includes thorough review of current medical information, gathering information on past medical history/social history and prior level of function, completion of standardized testing/informal observation of tasks, assessment of data and education on plan of care and goals.     CPT codes:  [x] Low Complexity PT evaluation 08635  [] Moderate Complexity PT evaluation 88062  [] High Complexity PT evaluation 34419  [] PT Re-evaluation 47276  [] Gait training 95547 0 minutes  [] Manual therapy 96379 0 minutes  [] Therapeutic activities 43637 0 minutes  [] Therapeutic exercises 02643 0 minutes  [] Neuromuscular reeducation 99485 0 minutes     Anabell Johnson, PT, DPT  [id number]

## 2021-03-22 NOTE — PROGRESS NOTES
Pt admitted to room 6318, pt states she is having pain of 10/10 on the right side of her body where everything hurts, went over pt's medications, ask pt if she would take tylenol, she stated she would, gave pt tylenol, did pt's admission, pt stated she would love to go to sleep for awhile, will continue to monitor.

## 2021-03-22 NOTE — ED NOTES
Bed: 07  Expected date:   Expected time:   Means of arrival:   Comments:  Room 700 [facility] [place], [address] [place] [address]  03/21/21 3661

## 2021-03-22 NOTE — H&P
Shelly Flannery M.D. History and Physical      CHIEF COMPLAINT: Syncope and collapse    Reason for Admission: Syncope and collapse    History Obtained From:  patient, electronic medical record    HISTORY OF PRESENT ILLNESS:      The patient is a 63 y.o. female of Genny Blackwell DO with significant past medical history of anxiety, hyperlipidemia, GERD, GI bleed who presents with sudden dizziness leading to syncopal episode and collapse. Patient states she had a woken from sleeping and walked downstairs, poured  a glass of iced tea and then felt dizzy and as if she would pass out. She tried to get herself to the floor but blacked out before she could do so leading into her falling on her right side. States she was alone and no one was there to witness the episode. She is currently alert and oriented and has had no further episodes and just feels weak and also having right sided pain. Blood pressure (!) 129/58, pulse 75, temperature 97.6 °F (36.4 °C), temperature source Temporal, resp. rate 16, height 5' 2\" (1.575 m), weight 150 lb 1.6 oz (68.1 kg), SpO2 97 %, not currently breastfeeding.     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    UPPER GASTROINTESTINAL ENDOSCOPY N/A 2020    EGD ESOPHAGOGASTRODUODENOSCOPY performed by Antoine Gruber MD at [address] [place] [address] N/A 2020    EGD CONTROL HEMORRHAGE performed by Antoine Gruber MD at [address] [place] [address] N/A 2020    EGD DIAGNOSTIC ONLY performed by Antoine Gruber MD at [address] [place] [address]         Medications Prior to Admission:    Medications Prior to Admission: traZODone (DESYREL) 25 mg TABS, Take 25 mg by mouth nightly  pantoprazole (PROTONIX) 40 MG tablet, Take 1 tablet by mouth 2 times daily (before meals)  venlafaxine (EFFEXOR XR) 150 MG extended release capsule, Take 150 mg by mouth daily  fluticasone (FLONASE) 50 MCG/ACT nasal spray, 1 spray by Each Nostril route nightly  vitamin D (ERGOCALCIFEROL) 1.25 MG (40582 UT) CAPS capsule, Take 50,000 Units by mouth once a week Sunday  busPIRone (BUSPAR) 15 MG tablet, Take 15 mg by mouth 2 times daily  atorvastatin (LIPITOR) 20 MG tablet, Take 20 mg by mouth daily    Allergies:  Penicillin g and Pcn [penicillins]    Social History:   TOBACCO:   reports that she has quit smoking. She smoked 0.50 packs per day. She has never used smokeless tobacco.  ETOH:   reports current alcohol use of about 3.0 standard drinks of alcohol per week. MARITAL STATUS:    OCCUPATION:      Family History:       Problem Relation Age of Onset    High Blood Pressure Mother     Cancer Father         prostate       REVIEW OF SYSTEMS:    General ROS: negative for - chills, fatigue or fever  Hematological and Lymphatic ROS: negative  Endocrine ROS: negative  Respiratory ROS: no cough, shortness of breath, or wheezing  Cardiovascular ROS: no chest pain or dyspnea on exertion  Gastrointestinal ROS: no abdominal pain, change in bowel habits, or black or bloody stools  Genito-Urinary ROS: no dysuria, trouble voiding, or hematuria  Neurological ROS: no TIA or stroke symptoms  negative    Vitals:  BP (!) 129/58   Pulse 75   Temp 97.6 °F (36.4 °C) (Temporal)   Resp 16   Ht 5' 2\" (1.575 m)   Wt 150 lb 1.6 oz (68.1 kg)   SpO2 97%   BMI 27.45 kg/m²     PHYSICAL EXAM:  General:  Awake, alert, oriented X 3. Well developed, well nourished, well groomed. No apparent distress. HEENT:  Normocephalic, atraumatic. Pupils equal, round, reactive to light. No scleral icterus. No conjunctival injection. Normal lips, teeth, and gums.   No nasal discharge. Neck:  Supple, FROM  Heart:  RRR, no murmurs, gallops, rubs, carotid upstroke normal, no carotid bruits  Lungs:  CTA bilaterally, bilat symmetrical expansion, no wheeze, rales, or rhonchi  Abdomen: Bowel sounds present, soft, nontender, no masses, no organomegaly, no peritoneal signs  Extremities:  No clubbing, cyanosis, or edema  Skin:  Warm and dry, no open lesions or rash  Neuro:  Cranial nerves 2-12 intact, no focal deficits  Vascular: Radial and pedal Pulses 2+  Breast: deferred  Rectal: deferred  Genitalia:  deferred      DATA:     Recent Labs     03/21/21  1516 03/22/21  0505   WBC 7.9 6.0   HGB 13.1 12.0    325     Recent Labs     03/21/21  1516 03/22/21  0505    143   K 4.1 3.8   BUN 12 14   CREATININE 0.8 0.8     Recent Labs     03/21/21  1516   PROT 7.1     Recent Labs     03/21/21  1516   AST 12   ALT 17   ALKPHOS 116*   BILITOT 0.3     No results for input(s): BNP in the last 72 hours. Recent Labs     03/21/21  1516   TROPONINI <0.01       ASSESSMENT:      Principal Problem:    Syncope and collapse  Active Problems:    HLD (hyperlipidemia)    Depression    GERD (gastroesophageal reflux disease)    Vitamin D deficiency  Resolved Problems:    * No resolved hospital problems. *        PLAN:    59-year-old female presenting with dizziness leading to a syncopal episode and collapse after walking down a flight of stairs successfully.   CT of the head and cervical spine both negative  Echocardiogram ordered  Check orthostatics  Review EKG  No evidence of stroke or siezures      Pneumonia- recent hx of covid in November   Pulmonary CTA showing atelectasis and groundglass opacities in the right middle lobe- residual  from previous covid infection   Covid negative now   Pulmonary following     UTI  Urinalysis showing moderate bacteria  Send urine culture    Hyperlipidemia  Continue Lipitor    Depression/anxiety  Continue home meds    DVT prophylaxis  PT OT  Discharge plan-ok for dc from medicine if no plans by [address], MD  3/22/2021  10:52 AM

## 2021-03-23 VITALS
HEIGHT: 62 IN | DIASTOLIC BLOOD PRESSURE: 98 MMHG | HEART RATE: 68 BPM | OXYGEN SATURATION: 99 % | WEIGHT: 160.2 LBS | SYSTOLIC BLOOD PRESSURE: 122 MMHG | RESPIRATION RATE: 18 BRPM | TEMPERATURE: 98.7 F | BODY MASS INDEX: 29.48 KG/M2

## 2021-03-23 LAB
LV EF: 60 %
LVEF MODALITY: NORMAL

## 2021-03-23 PROCEDURE — G0378 HOSPITAL OBSERVATION PER HR: HCPCS

## 2021-03-23 PROCEDURE — 6370000000 HC RX 637 (ALT 250 FOR IP): Performed by: NURSE PRACTITIONER

## 2021-03-23 PROCEDURE — 96376 TX/PRO/DX INJ SAME DRUG ADON: CPT

## 2021-03-23 PROCEDURE — 2580000003 HC RX 258: Performed by: NURSE PRACTITIONER

## 2021-03-23 PROCEDURE — 99225 PR SBSQ OBSERVATION CARE/DAY 25 MINUTES: CPT | Performed by: INTERNAL MEDICINE

## 2021-03-23 PROCEDURE — 6360000002 HC RX W HCPCS: Performed by: INTERNAL MEDICINE

## 2021-03-23 PROCEDURE — 93306 TTE W/DOPPLER COMPLETE: CPT

## 2021-03-23 RX ADMIN — ACETAMINOPHEN 650 MG: 325 TABLET ORAL at 10:07

## 2021-03-23 RX ADMIN — BUSPIRONE HYDROCHLORIDE 15 MG: 10 TABLET ORAL at 10:04

## 2021-03-23 RX ADMIN — Medication 10 ML: at 10:05

## 2021-03-23 RX ADMIN — KETOROLAC TROMETHAMINE 15 MG: 30 INJECTION, SOLUTION INTRAMUSCULAR; INTRAVENOUS at 06:17

## 2021-03-23 RX ADMIN — PANTOPRAZOLE SODIUM 40 MG: 40 TABLET, DELAYED RELEASE ORAL at 06:17

## 2021-03-23 RX ADMIN — SODIUM CHLORIDE, PRESERVATIVE FREE 10 ML: 5 INJECTION INTRAVENOUS at 06:17

## 2021-03-23 RX ADMIN — VENLAFAXINE HYDROCHLORIDE 150 MG: 150 CAPSULE, EXTENDED RELEASE ORAL at 10:05

## 2021-03-23 ASSESSMENT — PAIN SCALES - GENERAL
PAINLEVEL_OUTOF10: 0
PAINLEVEL_OUTOF10: 1
PAINLEVEL_OUTOF10: 1
PAINLEVEL_OUTOF10: 5

## 2021-03-23 ASSESSMENT — PAIN DESCRIPTION - DESCRIPTORS
DESCRIPTORS: ACHING
DESCRIPTORS: ACHING

## 2021-03-23 ASSESSMENT — PAIN DESCRIPTION - PROGRESSION: CLINICAL_PROGRESSION: GRADUALLY IMPROVING

## 2021-03-23 ASSESSMENT — PAIN DESCRIPTION - LOCATION: LOCATION: SHOULDER;RIB CAGE

## 2021-03-23 ASSESSMENT — PAIN - FUNCTIONAL ASSESSMENT
PAIN_FUNCTIONAL_ASSESSMENT: ACTIVITIES ARE NOT PREVENTED
PAIN_FUNCTIONAL_ASSESSMENT: ACTIVITIES ARE NOT PREVENTED

## 2021-03-23 ASSESSMENT — PAIN DESCRIPTION - ORIENTATION
ORIENTATION: RIGHT
ORIENTATION: RIGHT

## 2021-03-23 ASSESSMENT — PAIN DESCRIPTION - ONSET
ONSET: ON-GOING
ONSET: ON-GOING

## 2021-03-23 NOTE — PATIENT CARE CONFERENCE
P Quality Flow/Interdisciplinary Rounds Progress Note        Quality Flow Rounds held on March 23, 2021    Disciplines Attending:  Bedside Nurse, ,  and Nursing Unit [address] [facility]21/2021  3:44 PM    Anticipated Discharge Date:  Expected Discharge Date: 03/23/21    Disposition:    Vishal Score:  Vishal Scale Score: 22    Readmission Risk              Risk of Unplanned Readmission:        0           Discussed patient goal for the day, patient clinical progression, and barriers to discharge.   The following Goal(s) of the Day/Commitment(s) have been identified:  discharge [address]  March 23, 2021

## 2021-03-23 NOTE — PROGRESS NOTES
[address] of Pulmonary, [facility]                                                                       Pulmonary &health [facility]                                                                   Pulmonary Consult Note          Reason for Consultation:Possible mass   CC : syncope  HPI:   Doing very well  No SOB  No chest pain     PHYSICAL EXAMINATION:     VITAL SIGNS:  BP (!) 122/98   Pulse 68   Temp 98.7 °F (37.1 °C) (Tympanic)   Resp 18   Ht 5' 2\" (1.575 m)   Wt 160 lb 3.2 oz (72.7 kg)   SpO2 99%   BMI 29.30 kg/m²   Wt Readings from Last 3 Encounters:   03/23/21 160 lb 3.2 oz (72.7 kg)   05/29/20 150 lb (68 kg)   02/06/20 145 lb (65.8 kg)     Temp Readings from Last 3 Encounters:   03/23/21 98.7 °F (37.1 °C) (Tympanic)   05/29/20 98.1 °F (36.7 °C)   01/17/20 97.8 °F (36.6 °C) (Temporal)     TMAX:  BP Readings from Last 3 Encounters:   03/23/21 (!) 122/98   05/29/20 124/76   05/29/20 135/69     Pulse Readings from Last 3 Encounters:   03/23/21 68   05/29/20 74   02/06/20 87           INTAKE/OUTPUTS:  I/O last 3 completed shifts:   In: 1150 [P.O.:470; I.V.:680]  Out: 1050 [Urine:1050]    Intake/Output Summary (Last 24 hours) at 3/23/2021 1342  Last data filed at 3/23/2021 0951  Gross per 24 hour   Intake 470 ml   Output 550 ml   Net -80 ml       General Appearance: alert and oriented to person, place and time, well-developed and   well-nourished, in no acute distress   Eyes: pupils equal, round, and reactive to light, extraocular eye movements intact, conjunctivae normal and sclera anicteric   Neck: neck supple and non tender without mass, no thyromegaly, no thyroid nodules and no cervical adenopathy   Pulmonary/Chest:scattred rhonchi   Cardiovascular: normal rate, regular rhythm, normal S1 and S2, no murmurs, rubs, clicks or gallops, distal pulses intact, no carotid bruits, no murmurs, no gallops, no carotid bruits and no JVD   Abdomen: obese, soft, non-tender, non-distended, normal bowel sounds, no masses or organomegaly   Extremities:no edema   Musculoskeletal: normal range of motion, no joint swelling, deformity or tenderness   Neurologic: reflexes normal and symmetric, no cranial nerve deficit noted    LABS/IMAGING:    CBC:  Lab Results   Component Value Date    WBC 6.0 03/22/2021    HGB 12.0 03/22/2021    HCT 36.5 03/22/2021    MCV 82.4 03/22/2021     03/22/2021    LYMPHOPCT 16.7 (L) 03/21/2021    RBC 4.43 03/22/2021    MCH 27.1 03/22/2021    MCHC 32.9 03/22/2021    RDW 13.7 03/22/2021    NEUTOPHILPCT 73.7 03/21/2021    MONOPCT 7.4 03/21/2021    BASOPCT 0.6 03/21/2021    NEUTROABS 5.81 03/21/2021    LYMPHSABS 1.32 (L) 03/21/2021    MONOSABS 0.58 03/21/2021    EOSABS 0.08 03/21/2021    BASOSABS 0.05 03/21/2021       Recent Labs     03/22/21  0505 03/21/21  1516   WBC 6.0 7.9   HGB 12.0 13.1   HCT 36.5 39.5   MCV 82.4 81.3    362       BMP:   Recent Labs     03/21/21  1516 03/22/21  0505    143   K 4.1 3.8    107   CO2 26 27   BUN 12 14   CREATININE 0.8 0.8       MG:   Lab Results   Component Value Date    MG 1.9 01/15/2020     Ca/Phos:   Lab Results   Component Value Date    CALCIUM 9.3 03/22/2021    PHOS 1.9 (L) 01/15/2020     Amylase: No results found for: AMYLASE  Lipase:   Lab Results   Component Value Date    LIPASE 25 01/06/2020     LIVER PROFILE:   Recent Labs     03/21/21  1516   AST 12   ALT 17   BILITOT 0.3   ALKPHOS 116*       PT/INR: No results for input(s): PROTIME, INR in the last 72 hours. APTT: No results for input(s): APTT in the last 72 hours.     Cardiac Enzymes:  Lab Results   Component Value Date    TROPONINI <0.01 03/21/2021       Hgb A1C: No results found for: LABA1C  No results found for: EAG  KOREY: No results found for: KOREY  ESR: No results found for: SEDRATE  CRP: No results found for: CRP  D Dimer: No results found for: DDIMER  Folate and B12: No results found for: [id number], No results found for: FOLATE    atelectasis lower lobes           PROBLEM LIST:  Patient Active Problem List   Diagnosis    Hematemesis    Gastrointestinal hemorrhage    Syncope and collapse    HLD (hyperlipidemia)    Depression    GERD (gastroesophageal reflux disease)    Vitamin D deficiency               ASSESSMENT:  1.) Possible COPD  2.)Atelectasis   3.)syncope        PLAN:  Can be discharged   *-Aggressive pulmonary toilet   *-Ambulate before discharge and check Sat   *-pulse OX   *_PFT and follow up as OP  -check procalcitoninin and CRP   follow up in 3 months with CT scan      Thank you very much for allowing me to participate in the care of this pleasant patient , should you have any questions ,please do not hesitate to contact me    Vandana Urena [address] and [facility]

## 2021-03-23 NOTE — DISCHARGE SUMMARY
Physician Discharge Summary     Patient ID:  Sd Laureano  [id number]  63 y.o.  1965    Admit date: 3/21/2021    Discharge date and time: 3/23/2021    Admission Diagnoses:   Patient Active Problem List   Diagnosis    Hematemesis    Gastrointestinal hemorrhage    Syncope and collapse    HLD (hyperlipidemia)    Depression    GERD (gastroesophageal reflux disease)    Vitamin D deficiency       Discharge Diagnoses: Shortness of breath    Consults: Pulmonology    Procedures: Echo is unremarkable with ejection fraction 60%    Hospital Course: 29-year-old female presenting with dizziness leading to a syncopal episode and collapse after walking down a flight of stairs successfully.   CT of the head and cervical spine both negative  Echocardiogram ordered-EF 60% without any valvular abnormalities  Check orthostatics  No evidence of stroke or siezures       Pneumonia- recent hx of covid in November   Pulmonary CTA showing atelectasis and groundglass opacities in the right middle lobe-   residual  from previous covid infection   Covid negative now   Pulmonary followed     UTI  Urinalysis showing moderate bacteria  Send urine culture     Hyperlipidemia  Continue Lipitor     Depression/anxiety  Continue home meds     Okay for discharge    Recent Labs     03/21/21  1516 03/22/21  0505   WBC 7.9 6.0   HGB 13.1 12.0   HCT 39.5 36.5    325       Recent Labs     03/21/21  1516 03/22/21  0505    143   K 4.1 3.8    107   CO2 26 27   BUN 12 14   CREATININE 0.8 0.8   CALCIUM 9.4 9.3       Xr Ribs Right Include Chest (min 3 Views)    Result Date: 3/21/2021  EXAMINATION: 4 XRAY VIEWS OF THE RIGHT RIBS WITH FRONTAL XRAY VIEW OF THE CHEST 3/21/2021 4:05 pm COMPARISON: 01/13/2020 HISTORY: ORDERING SYSTEM PROVIDED HISTORY: trauma TECHNOLOGIST PROVIDED HISTORY: Reason for exam:->trauma What reading provider will be dictating this exam?->CRC FINDINGS: The heart is normal.  The pulmonary vessels are normal.  No consolidation or effusion is seen. There are mild linear densities scattered along the right lung base which is more prominent. No effusion or pneumothorax is seen. No rib fracture is seen. Mild right basilar discoid atelectasis or scarring which is more prominent otherwise, unremarkable. Specifically, no rib fracture seen. Xr Shoulder Right (min 2 Views)    Result Date: 3/21/2021  EXAMINATION: THREE XRAY VIEWS OF THE RIGHT SHOULDER 3/21/2021 4:06 pm COMPARISON: None. HISTORY: ORDERING SYSTEM PROVIDED HISTORY: trauma TECHNOLOGIST PROVIDED HISTORY: Reason for exam:->trauma What reading provider will be dictating this exam?->CRC FINDINGS: The glenohumeral joint is intact. No fracture or dislocation is seen. The [facility] joint is intact. There is mild subsegmental linear opacity along the right lung base. No effusion is seen. No acute bony abnormality seen. Mild subsegmental atelectasis vs early infiltrate along the right lung base. Ct Head Wo Contrast    Result Date: 3/21/2021  EXAMINATION: CT OF THE CERVICAL SPINE WITHOUT CONTRAST; CT OF THE HEAD WITHOUT CONTRAST 3/21/2021 4:08 pm TECHNIQUE: CT of the cervical spine was performed without the administration of intravenous contrast. Multiplanar reformatted images are provided for review. Dose modulation, iterative reconstruction, and/or weight based adjustment of the mA/kV was utilized to reduce the radiation dose to as low as reasonably achievable.; CT of the head was performed without the administration of intravenous contrast. Dose modulation, iterative reconstruction, and/or weight based adjustment of the mA/kV was utilized to reduce the radiation dose to as low as reasonably achievable. COMPARISON: None.  HISTORY: ORDERING SYSTEM PROVIDED HISTORY: nexus positive TECHNOLOGIST PROVIDED HISTORY: Reason for exam:->nexus positive Decision Support Exception->Emergency Medical Condition (MA) What reading provider will be dictating this exam?->CRC FINDINGS: CT OF THE BRAIN: BRAIN/VENTRICLES: There is no acute intracranial hemorrhage, mass effect or midline shift. No abnormal extra-axial fluid collection. The gray-white differentiation is maintained without evidence of an acute infarct. There is no evidence of hydrocephalus. ORBITS: The visualized portion of the orbits demonstrate no acute abnormality. SINUSES: The visualized paranasal sinuses and mastoid air cells demonstrate no acute abnormality. SOFT TISSUES/SKULL: No acute abnormality of the visualized skull or soft tissues. CT CERVICAL SPINE: BONES/ALIGNMENT:  No fracture or joint dislocation is seen. Postoperative changes from ACDF are noted from C5-C7. The surgical hardware is grossly well position. No perihardware lucency is seen to indicate loosening or infection. DEGENERATIVE CHANGES: Uncovertebral joint hypertrophic changes at C5-6 result in mild bilateral neural foraminal stenoses. SOFT TISSUES: There is no prevertebral soft tissue swelling. CT head without contrast: 1. No skull fracture or acute intracranial abnormality. CT cervical spine without contrast: 1. No fracture or joint dislocation is seen. 2. Degenerative and postoperative changes, as described. Ct Cervical Spine Wo Contrast    Result Date: 3/21/2021  EXAMINATION: CT OF THE CERVICAL SPINE WITHOUT CONTRAST; CT OF THE HEAD WITHOUT CONTRAST 3/21/2021 4:08 pm TECHNIQUE: CT of the cervical spine was performed without the administration of intravenous contrast. Multiplanar reformatted images are provided for review. Dose modulation, iterative reconstruction, and/or weight based adjustment of the mA/kV was utilized to reduce the radiation dose to as low as reasonably achievable.; CT of the head was performed without the administration of intravenous contrast. Dose modulation, iterative reconstruction, and/or weight based adjustment of the mA/kV was utilized to reduce the radiation dose to as low as reasonably achievable. COMPARISON: None. HISTORY: ORDERING SYSTEM PROVIDED HISTORY: nexus positive TECHNOLOGIST PROVIDED HISTORY: Reason for exam:->nexus positive Decision Support Exception->Emergency Medical Condition (MA) What reading provider will be dictating this exam?->CRC FINDINGS: CT OF THE BRAIN: BRAIN/VENTRICLES: There is no acute intracranial hemorrhage, mass effect or midline shift. No abnormal extra-axial fluid collection. The gray-white differentiation is maintained without evidence of an acute infarct. There is no evidence of hydrocephalus. ORBITS: The visualized portion of the orbits demonstrate no acute abnormality. SINUSES: The visualized paranasal sinuses and mastoid air cells demonstrate no acute abnormality. SOFT TISSUES/SKULL: No acute abnormality of the visualized skull or soft tissues. CT CERVICAL SPINE: BONES/ALIGNMENT:  No fracture or joint dislocation is seen. Postoperative changes from ACDF are noted from C5-C7. The surgical hardware is grossly well position. No perihardware lucency is seen to indicate loosening or infection. DEGENERATIVE CHANGES: Uncovertebral joint hypertrophic changes at C5-6 result in mild bilateral neural foraminal stenoses. SOFT TISSUES: There is no prevertebral soft tissue swelling. CT head without contrast: 1. No skull fracture or acute intracranial abnormality. CT cervical spine without contrast: 1. No fracture or joint dislocation is seen. 2. Degenerative and postoperative changes, as described. Cta Pulmonary W Contrast    Result Date: 3/21/2021  EXAMINATION: CTA OF THE CHEST 3/21/2021 9:27 pm TECHNIQUE: CTA of the chest was performed after the administration of intravenous contrast.  Multiplanar reformatted images are provided for review. MIP images are provided for review. Dose modulation, iterative reconstruction, and/or weight based adjustment of the mA/kV was utilized to reduce the radiation dose to as low as reasonably achievable. COMPARISON: None.  HISTORY: ORDERING SYSTEM PROVIDED HISTORY: syncope, trauma TECHNOLOGIST PROVIDED HISTORY: Reason for exam:->syncope, trauma Decision Support Exception->Emergency Medical Condition (MA) What reading provider will be dictating this exam?->CRC FINDINGS: The heart and the great vessels are normal.  Trachea and major bronchi are patent. Nonenlarged mediastinal and hilar lymph nodes are present. There are no filling defects in the main pulmonary artery and the central branches. There is mild COPD with atelectasis in lung bases. There is minimal atelectasis/ground-glass densities in the right middle lobe. There is no pleural effusion. The liver is of normal architecture. There is dilated GE junction. Gallbladder is distended. No central pulmonary embolism or aortic dissection. Atelectasis lung bases and ground-glass opacities in the right middle lobe. Pneumonia including viral infection has to be excluded. Discharge Exam:    HEENT: NCAT,  PERRLA, No JVD  Heart:  RRR, no murmurs, gallops, or rubs.   Lungs:  CTA bilaterally, no wheeze, rales or rhonchi  Abd: bowel sounds present, nontender, nondistended, no masses  Extrem:  No clubbing, cyanosis, or edema    Disposition: home     Patient Condition at Discharge: Stable    Patient Instructions:      Medication List      CONTINUE taking these medications    atorvastatin 20 MG tablet  Commonly known as: LIPITOR     busPIRone 15 MG tablet  Commonly known as: BUSPAR     fluticasone 50 MCG/ACT nasal spray  Commonly known as: FLONASE     pantoprazole 40 MG tablet  Commonly known as: PROTONIX  Take 1 tablet by mouth 2 times daily (before meals)     traZODone 25 mg Tabs  Commonly known as: DESYREL     venlafaxine 150 MG extended release capsule  Commonly known as: EFFEXOR XR     vitamin D 1.25 MG (82631 UT) Caps capsule  Commonly known as: ERGOCALCIFEROL        STOP taking these medications    diclofenac 75 MG EC tablet  Commonly known as: VOLTAREN          Activity: activity as tolerated  Diet: regular diet    Pt has been advised to: Follow-up with Megan Patel DO in 1 week.   Follow-up with consultants as recommended by them    Note that over 30 minutes was spent in preparing discharge papers, discussing discharge with patient, medication review, etc.    Signed:  Silverio Meneses MD  3/23/2021  1:00 PM

## 2021-04-23 ENCOUNTER — OFFICE VISIT (OUTPATIENT)
Dept: CARDIOLOGY CLINIC | Age: 56
End: 2021-04-23
Payer: COMMERCIAL

## 2021-04-23 VITALS
OXYGEN SATURATION: 96 % | HEIGHT: 62 IN | HEART RATE: 68 BPM | RESPIRATION RATE: 18 BRPM | SYSTOLIC BLOOD PRESSURE: 122 MMHG | WEIGHT: 161.2 LBS | DIASTOLIC BLOOD PRESSURE: 74 MMHG | BODY MASS INDEX: 29.66 KG/M2

## 2021-04-23 DIAGNOSIS — R55 VASOVAGAL SYNCOPE: Primary | ICD-10-CM

## 2021-04-23 PROCEDURE — 99203 OFFICE O/P NEW LOW 30 MIN: CPT | Performed by: INTERNAL MEDICINE

## 2021-04-23 PROCEDURE — G8419 CALC BMI OUT NRM PARAM NOF/U: HCPCS | Performed by: INTERNAL MEDICINE

## 2021-04-23 PROCEDURE — 4004F PT TOBACCO SCREEN RCVD TLK: CPT | Performed by: INTERNAL MEDICINE

## 2021-04-23 PROCEDURE — 93000 ELECTROCARDIOGRAM COMPLETE: CPT | Performed by: INTERNAL MEDICINE

## 2021-04-23 PROCEDURE — G8427 DOCREV CUR MEDS BY ELIG CLIN: HCPCS | Performed by: INTERNAL MEDICINE

## 2021-04-23 PROCEDURE — 3017F COLORECTAL CA SCREEN DOC REV: CPT | Performed by: INTERNAL MEDICINE

## 2021-04-23 NOTE — PROGRESS NOTES
EGD CONTROL HEMORRHAGE performed by Amos Coy MD at [facility] N/A 5/29/2020    EGD DIAGNOSTIC ONLY performed by Amos Coy MD at [facility] History:    Social History     Tobacco History     Smoking Status  Current Every Day Smoker Smoking Frequency  0 packs/day for 20 years (0 pk yrs)    Smokeless Tobacco Use  Never Used          Alcohol History     Alcohol Use Status  Yes Comment  occ          Drug Use     Drug Use Status  No          Sexual Activity     Sexually Active  Not Asked                    Family History:-      Problem Relation Age of Onset    High Blood Pressure Mother     Cancer Father         prostate         Review of Systems   Negative except as in HPI        Medications     Current Outpatient Medications   Medication Sig Dispense Refill    traZODone (DESYREL) 25 mg TABS Take 25 mg by mouth nightly      pantoprazole (PROTONIX) 40 MG tablet Take 1 tablet by mouth 2 times daily (before meals) 60 tablet 3    venlafaxine (EFFEXOR XR) 150 MG extended release capsule Take 150 mg by mouth daily      fluticasone (FLONASE) 50 MCG/ACT nasal spray 1 spray by Each Nostril route nightly      vitamin D (ERGOCALCIFEROL) 1.25 MG (42985 UT) CAPS capsule Take 50,000 Units by mouth once a week Sunday      busPIRone (BUSPAR) 15 MG tablet Take 15 mg by mouth 2 times daily      atorvastatin (LIPITOR) 20 MG tablet Take 20 mg by mouth daily       No current facility-administered medications for this visit. Physical Examination      /74   Pulse 68   Resp 18   Ht 5' 2\" (1.575 m)   Wt 161 lb 3.2 oz (73.1 kg)   SpO2 96%   BMI 29.48 kg/m²   Body surface area is 1.79 meters squared.      General: No acute distress, appears as stated age, nonicteric  Head: Atraumatic, no gross abnormalities or bruises  Neck: Supple and nontender, no carotid bruits, no JVP  Lungs: Clear to auscultation bilaterally, no wheezes, rales, or Disease  Office: [phone]  Fax: [phone]  RN Coordinator: Odilon Marsh

## 2021-04-26 RX ORDER — PANTOPRAZOLE SODIUM 40 MG/1
TABLET, DELAYED RELEASE ORAL
Qty: 60 TABLET | Refills: 3 | Status: SHIPPED | OUTPATIENT
Start: 2021-04-26

## 2021-08-16 ENCOUNTER — TELEPHONE (OUTPATIENT)
Dept: CARDIOLOGY CLINIC | Age: 56
End: 2021-08-16

## 2021-08-16 NOTE — TELEPHONE ENCOUNTER
Pt is having surgery on her right thumb tomorrow and they would like cardiac clearance. She was last seen in April. Please advise, [phone] option 0 ask for Deborah. They are faxing over te details.

## 2022-06-01 ENCOUNTER — HOSPITAL ENCOUNTER (OUTPATIENT)
Dept: MAMMOGRAPHY | Age: 57
Discharge: HOME | End: 2022-06-03
Payer: COMMERCIAL

## 2022-06-01 DIAGNOSIS — Z78.0 POST-MENOPAUSAL: ICD-10-CM

## 2022-06-01 PROCEDURE — 77080 DXA BONE DENSITY AXIAL: CPT

## 2022-07-18 ENCOUNTER — OFFICE VISIT (OUTPATIENT)
Dept: CARDIOLOGY CLINIC | Age: 57
End: 2022-07-18
Payer: COMMERCIAL

## 2022-07-18 VITALS
BODY MASS INDEX: 30 KG/M2 | WEIGHT: 163 LBS | SYSTOLIC BLOOD PRESSURE: 130 MMHG | HEIGHT: 62 IN | DIASTOLIC BLOOD PRESSURE: 76 MMHG | RESPIRATION RATE: 12 BRPM | HEART RATE: 77 BPM

## 2022-07-18 DIAGNOSIS — R55 VASOVAGAL SYNCOPE: Primary | ICD-10-CM

## 2022-07-18 PROCEDURE — 3017F COLORECTAL CA SCREEN DOC REV: CPT | Performed by: INTERNAL MEDICINE

## 2022-07-18 PROCEDURE — 99213 OFFICE O/P EST LOW 20 MIN: CPT | Performed by: INTERNAL MEDICINE

## 2022-07-18 PROCEDURE — G8427 DOCREV CUR MEDS BY ELIG CLIN: HCPCS | Performed by: INTERNAL MEDICINE

## 2022-07-18 PROCEDURE — 4004F PT TOBACCO SCREEN RCVD TLK: CPT | Performed by: INTERNAL MEDICINE

## 2022-07-18 PROCEDURE — G8419 CALC BMI OUT NRM PARAM NOF/U: HCPCS | Performed by: INTERNAL MEDICINE

## 2022-07-18 PROCEDURE — 93000 ELECTROCARDIOGRAM COMPLETE: CPT | Performed by: INTERNAL MEDICINE

## 2022-07-18 NOTE — PROGRESS NOTES
[address] [facility]   Heart and Vascular [place]   Clinic Note     Date:22   Patient Thai Torres  YOB: 1965  Age: 61 y.o. Primary Care Provider: DO Tavon Kendall     This is a pleasant 14-year-old  female who is referred for syncope. No hospitalizations or ED visits. No syncope, no presyncope. Tries to do house work and plays with grandchildren. No chest pain. Some dyspnea with more than a flight of stairs. No edema, orthopena or PND. A focused history review includes:  GERD  IFG (Hb A1c 5.9%)  UGIB due to gastric Dieulafoy lesion s/p clipping 2020  No family history of premature ASCVD or SCD  Smoked for 20x0.5;  Social EtOH; No recreational drugs   for [employer]  COVID-19 in  [facility] and 2022, outpatient treatment for both      Past History    Past Medical History:         Diagnosis Date    History of blood transfusion        Past Surgical History:  Past Surgical History:   Procedure Laterality Date    ABDOMEN SURGERY       SECTION      x2    FOOT TENDON SURGERY Right     right shin nerve damage procedure at same time    UPPER GASTROINTESTINAL ENDOSCOPY N/A 2020    EGD ESOPHAGOGASTRODUODENOSCOPY performed by Elizabeth Gray MD at [facility] N/A 2020    EGD CONTROL HEMORRHAGE performed by Elizabeth Gray MD at [facility] N/A 2020    EGD DIAGNOSTIC ONLY performed by Elizabeth Gray MD at [address] [place] [address] 2021       Social History:    Social History       Tobacco History       Smoking Status  Current Every Day Smoker Smoking Frequency  0 packs/day for 20 years (0 pk yrs)      Smokeless Tobacco Use  Never Used              Alcohol History       Alcohol Use Status  Yes Comment  occ              Drug Use       Drug Use Status  No              Sexual Activity Sexually Active  Not Asked                        Family History:-      Problem Relation Age of Onset    High Blood Pressure Mother     Cancer Father         prostate         Review of Systems   Negative except as in HPI        Medications     Current Outpatient Medications   Medication Sig Dispense Refill    pantoprazole (PROTONIX) 40 MG tablet take 1 tablet by mouth twice a day before meals (Patient taking differently: Take 40 mg by mouth in the morning.) 60 tablet 3    traZODone (DESYREL) 25 mg TABS Take 50 mg by mouth nightly      venlafaxine (EFFEXOR XR) 150 MG extended release capsule Take 150 mg by mouth daily      fluticasone (FLONASE) 50 MCG/ACT nasal spray 1 spray by Each Nostril route nightly      vitamin D (ERGOCALCIFEROL) 1.25 MG (55639 UT) CAPS capsule Take 50,000 Units by mouth once a week Sunday      busPIRone (BUSPAR) 15 MG tablet Take 15 mg by mouth 2 times daily      atorvastatin (LIPITOR) 20 MG tablet Take 20 mg by mouth daily       No current facility-administered medications for this visit. Physical Examination      /76   Pulse 77   Resp 12   Ht 5' 2\" (1.575 m)   Wt 163 lb (73.9 kg)   BMI 29.81 kg/m²   Body surface area is 1.8 meters squared.      General: No acute distress, appears as stated age, nonicteric  Head: Atraumatic, no gross abnormalities or bruises  Neck: Supple and nontender, no carotid bruits, no JVP  Lungs: Clear to auscultation bilaterally, no wheezes, rales, or rhonchi  Heart: Regular rate and rhythm, no murmurs, rubs, or gallops  Abdomen: Soft, nontender, nondistended, normal bowel sounds  Extremities: No obvious deformities, no cyanosis, no edema  Neurological: Alert and oriented x3, EOMI, moving all extremities x4  Psychological: Normal mood and affect, cooperative  Skin: Color, texture, and turgor normal for age          Labs/Imaging/Diagnostics   Personally reviewed:    Lab Results   Component Value Date/Time     03/22/2021 05:05 AM    K 3.8 03/22/2021 05:05 AM     03/22/2021 05:05 AM    CO2 27 03/22/2021 05:05 AM    BUN 14 03/22/2021 05:05 AM    CREATININE 0.8 03/22/2021 05:05 AM    GLUCOSE 96 03/22/2021 05:05 AM    CALCIUM 9.3 03/22/2021 05:05 AM        CrCl cannot be calculated (Patient's most recent lab result is older than the maximum 180 days allowed. ). Lab Results   Component Value Date    WBC 6.0 03/22/2021    HGB 12.0 03/22/2021    HCT 36.5 03/22/2021    MCV 82.4 03/22/2021     03/22/2021       Lab Results   Component Value Date    ALT 17 03/21/2021    AST 12 03/21/2021    ALKPHOS 116 (H) 03/21/2021    BILITOT 0.3 03/21/2021       Lab Results   Component Value Date    LABALBU 4.5 03/21/2021         Lab Results   Component Value Date    TROPONINI <0.01 03/21/2021         Personally interpreted the following studies.:  EKG: Normal sinus rhythm. Normal        Assessment and Plan:        79-year-old  female with prior history of vasovagal syncope. She is asymptomatic from cardiac standpoint with good functional capacity. She is on primary prevention atorvastatin. Diagnosis Orders   1. Vasovagal syncope  EKG 12 Lead           Follow-up with Dr. Katherine Mckay. Assessment and plan reviewed with the patient/family and their questions and concerns answered in full. Follow up with us as needed    We appreciate the opportunity to participate in Her care!       Yoanna Luna MD, [facility]  Interventional Cardiology/Structural Heart Disease  Office: [phone]  Fax: [phone]

## 2022-07-18 NOTE — LETTER
1931: Bedside shift change report given to Patricia Gustafson RN (oncoming nurse) by Speedy Hernandez RN (offgoing nurse). Report included the following information SBAR, Kardex, Intake/Output, MAR, Recent Results, Med Rec Status and Cardiac Rhythm NSR.     1945: TRANSFER - OUT REPORT:    Verbal report given to GERALD Em(name) on [employer]  being transferred to Surg Tele(unit) for routine progression of care       Report consisted of patients Situation, Background, Assessment and   Recommendations(SBAR). Information from the following report(s) SBAR, Kardex, Intake/Output, MAR, Recent Results, Med Rec Status and Cardiac Rhythm NSR was reviewed with the receiving nurse. Lines:   Peripheral IV 07/27/21 Right Antecubital (Active)   Site Assessment Clean, dry, & intact 07/28/21 1931   Phlebitis Assessment 0 07/28/21 1931   Infiltration Assessment 0 07/28/21 1931   Dressing Status Clean, dry, & intact 07/28/21 1931   Dressing Type Transparent;Tape 07/28/21 1931   Hub Color/Line Status Orange;Capped 07/28/21 1931   Action Taken Open ports on tubing capped 07/28/21 1931   Alcohol Cap Used Yes 07/28/21 1931        Opportunity for questions and clarification was provided.       2045: Patient transported with:   O2 @ 4 liters  Registered Nurse [facility]  [facility] [address] [address]  Phone: [phone]  Fax: [phone]    Kami Page MD    July 18, 2022     [facility] [address]    Patient: Marilee Garcia   MR Number: [id number]   YOB: 1965   Date of Visit: 7/18/2022       Dear Carolina Siu:    Thank you for referring Urvashi Ji to me for evaluation/treatment. Below are the relevant portions of my assessment and plan of care. If you have questions, please do not hesitate to call me. I look forward to following [facility] along with you.     Sincerely,      Kami Page MD

## 2022-08-08 ENCOUNTER — TELEPHONE (OUTPATIENT)
Dept: CARDIOLOGY CLINIC | Age: 57
End: 2022-08-08

## 2022-08-08 NOTE — TELEPHONE ENCOUNTER
Darcie vergara from Dr. Johnnie Vasques office patient is having wrist surgery (bone fragments removed)    Dr. Ashish Donohue not yet  scheduled     Due to age and risk factors requesting cardiac clearance       Last seen 7-18

## 2022-09-02 ENCOUNTER — HOSPITAL ENCOUNTER (OUTPATIENT)
Age: 57
Discharge: HOME | End: 2022-09-04

## 2022-09-02 PROCEDURE — 88305 TISSUE EXAM BY PATHOLOGIST: CPT

## 2022-11-01 ENCOUNTER — HOSPITAL ENCOUNTER (EMERGENCY)
Age: 57
Discharge: HOME | End: 2022-11-01
Payer: COMMERCIAL

## 2022-11-01 VITALS
SYSTOLIC BLOOD PRESSURE: 139 MMHG | RESPIRATION RATE: 18 BRPM | OXYGEN SATURATION: 98 % | HEIGHT: 62 IN | DIASTOLIC BLOOD PRESSURE: 76 MMHG | WEIGHT: 160 LBS | BODY MASS INDEX: 29.44 KG/M2 | HEART RATE: 94 BPM | TEMPERATURE: 98.4 F

## 2022-11-01 DIAGNOSIS — H66.90 ACUTE OTITIS MEDIA, UNSPECIFIED OTITIS MEDIA TYPE: ICD-10-CM

## 2022-11-01 DIAGNOSIS — J01.90 ACUTE SINUSITIS, RECURRENCE NOT SPECIFIED, UNSPECIFIED LOCATION: Primary | ICD-10-CM

## 2022-11-01 PROCEDURE — 99211 OFF/OP EST MAY X REQ PHY/QHP: CPT

## 2022-11-01 RX ORDER — DOXYCYCLINE HYCLATE 100 MG
100 TABLET ORAL 2 TIMES DAILY
Qty: 20 TABLET | Refills: 0 | Status: SHIPPED | OUTPATIENT
Start: 2022-11-01 | End: 2022-11-11

## 2022-11-01 ASSESSMENT — PAIN SCALES - GENERAL: PAINLEVEL_OUTOF10: 4

## 2022-11-01 ASSESSMENT — PAIN - FUNCTIONAL ASSESSMENT: PAIN_FUNCTIONAL_ASSESSMENT: 0-10

## 2022-11-01 ASSESSMENT — PAIN DESCRIPTION - LOCATION: LOCATION: GENERALIZED

## 2022-11-01 NOTE — ED PROVIDER NOTES
[facility]  Provider Note  Admit Date/RoomTime: 2022 11:42 AM  Room:   NAME: Mira Renteria  : 1965  MRN: [id number]     Chief Complaint:  Sinusitis (Sinus infection  pressure congestion sore throat ear pressure)    History of Present Illness       Mira Renteria is a 61 y.o. old female who presents to the urgent care center by private vehicle for her and her ears and pressure over her cheeks from the sinuses drainage and ear irritated throat. She does not have a fever cough chest pain or shortness of breath she did take a COVID test and it was negative. ROS    Pertinent positives and negatives are stated within HPI, all other systems reviewed and are negative. Past Surgical History:   Procedure Laterality Date    ABDOMEN SURGERY       SECTION      x2    FOOT TENDON SURGERY Right     right shin nerve damage procedure at same time    UPPER GASTROINTESTINAL ENDOSCOPY N/A 2020    EGD ESOPHAGOGASTRODUODENOSCOPY performed by Keren Pereira MD at [address] [place] [address] N/A 2020    EGD CONTROL HEMORRHAGE performed by Keren Pereira MD at [address] [place] [address] N/A 2020    EGD DIAGNOSTIC ONLY performed by Keren Pereira MD at [address] [place] [address] 2021   Social History:  reports that she has been smoking cigarettes. She has a 5.00 pack-year smoking history. She has never used smokeless tobacco. She reports current alcohol use. She reports that she does not use drugs. Family History: family history includes Cancer in her father; High Blood Pressure in her mother.    Allergies: Penicillin g and Pcn [penicillins]    Physical Exam            ED Triage Vitals [22 1147]   BP Temp Temp src Heart Rate Resp SpO2 Height Weight   139/76 98.4 °F (36.9 °C) -- 94 18 98 % 5' 2\" (1.575 m) 160 lb (72.6 kg)      Oxygen Saturation Interpretation: Normal.    Constitutional:  Alert, no distress  Ears:  External Ears: Bilateral normal.               TM's & External Canals: right TM erythematous and left TM erythematous. Nose:   There is no discharge, swelling or lesions noted. Sinuses: mild bilateral maxillary sinus tenderness. no bilateral frontal sinus tenderness. Mouth:  normal tongue and buccal mucosa. Throat: no erythema or exudates noted. Teeth and gums normal..  Airway patent. Neck/Lymphatics:  Neck Supple. Respiratory:   .  Lung sounds: normal.   CV:  Regular rate and rhythm, normal heart sounds, without pathological murmurs, ectopy, gallops, or rubs. GI:  Abdomen Soft, nontender, good bowel sounds. No firm or pulsatile mass. Integument:  Normal turgor. Warm, dry, without visible rash. Neurological:  Oriented. Motor functions intact. Lab / Imaging Results   (All laboratory and radiology results have been personally reviewed by myself)  Labs:  No results found for this visit on 11/01/22. Imaging: All Radiology results interpreted by Radiologist unless otherwise noted. No orders to display     ED Course / Medical Decision Making   Medications - No data to display       MDM:     1. Acute sinusitis, recurrence not specified, unspecified location    2. Acute otitis media, unspecified otitis media type      Plan   Discharge to home and advised to contact No follow-up provider specified. Patient condition is good    New Medications     New Prescriptions    DOXYCYCLINE HYCLATE (VIBRA-TABS) 100 MG TABLET    Take 1 tablet by mouth 2 times daily for 10 days     Electronically signed by JUANITO Gray CNP   DD: 11/1/22  **This report was transcribed using voice recognition software. Every effort was made to ensure accuracy; however, inadvertent computerized transcription errors may be present.   END OF ED PROVIDER NOTE      JUANITO Gray CNP  11/01/22 [id number]

## 2022-11-01 NOTE — Clinical Note
Glen Regalado was seen and treated in our emergency department on 11/1/2022. She may return to work on 11/02/2022. If you have any questions or concerns, please don't hesitate to call.       Tony Fox, APRN - CNP

## 2023-02-08 ENCOUNTER — HOSPITAL ENCOUNTER (EMERGENCY)
Age: 58
Discharge: HOME | End: 2023-02-08
Payer: COMMERCIAL

## 2023-02-08 VITALS
DIASTOLIC BLOOD PRESSURE: 83 MMHG | SYSTOLIC BLOOD PRESSURE: 136 MMHG | BODY MASS INDEX: 29.44 KG/M2 | TEMPERATURE: 98.3 F | RESPIRATION RATE: 18 BRPM | OXYGEN SATURATION: 97 % | HEIGHT: 62 IN | WEIGHT: 160 LBS | HEART RATE: 92 BPM

## 2023-02-08 DIAGNOSIS — J01.90 ACUTE NON-RECURRENT SINUSITIS, UNSPECIFIED LOCATION: Primary | ICD-10-CM

## 2023-02-08 PROCEDURE — 99211 OFF/OP EST MAY X REQ PHY/QHP: CPT

## 2023-02-08 RX ORDER — AZITHROMYCIN 250 MG/1
TABLET, FILM COATED ORAL
Qty: 1 PACKET | Refills: 0 | Status: SHIPPED | OUTPATIENT
Start: 2023-02-08 | End: 2023-02-12

## 2023-02-08 RX ORDER — BENZONATATE 100 MG/1
100 CAPSULE ORAL 3 TIMES DAILY PRN
Qty: 30 CAPSULE | Refills: 0 | Status: SHIPPED | OUTPATIENT
Start: 2023-02-08 | End: 2023-02-18

## 2023-02-08 ASSESSMENT — PAIN DESCRIPTION - LOCATION: LOCATION: EAR;THROAT

## 2023-02-08 ASSESSMENT — PAIN - FUNCTIONAL ASSESSMENT: PAIN_FUNCTIONAL_ASSESSMENT: 0-10

## 2023-02-08 ASSESSMENT — PAIN DESCRIPTION - FREQUENCY: FREQUENCY: CONTINUOUS

## 2023-02-08 ASSESSMENT — PAIN DESCRIPTION - ONSET: ONSET: ON-GOING

## 2023-02-08 ASSESSMENT — PAIN DESCRIPTION - DESCRIPTORS: DESCRIPTORS: SORE

## 2023-02-08 ASSESSMENT — PAIN SCALES - GENERAL: PAINLEVEL_OUTOF10: 5

## 2023-02-08 ASSESSMENT — PAIN DESCRIPTION - ORIENTATION: ORIENTATION: LEFT

## 2023-02-08 ASSESSMENT — PAIN DESCRIPTION - PAIN TYPE: TYPE: ACUTE PAIN

## 2023-02-08 NOTE — Clinical Note
Indio Amaro was seen and treated in our emergency department on 2/8/2023. She may return to work on 02/10/2023. May return sooner should symptoms improve     If you have any questions or concerns, please don't hesitate to call.       ADRIEL Posadas

## 2023-02-08 NOTE — ED PROVIDER NOTES
Department of Emergency Medicine   ED  Provider Note  Admit Date/RoomTime: 2023  1:53 PM  ED Room:             Chief Complaint:  Sinusitis (Pressure and drainage started 2 weeks ago ), Pharyngitis, Cough, and Otalgia (Left ear pain, slight pain right ear)      History of Present Illness:  Source of history provided by:  patient. History/Exam Limitations: none. Marcos Clements is a 61 y.o. old female presenting to the emergency department for facial pressure, nasal drainage, cough, left sided otalgia, which occured 2 week(s) prior to arrival.  Since onset the symptoms have been persistent. Denies chest pain, shortness of breath, difficulty swallowing, difficulty breathing, neck pain, neck stiffness, or rash. Does not  report sick contacts. Does not  report fever, chills and body aches. Patient denies recent travel. Patient denies all other symptoms at this time. Review of Systems:      Pertinent positives and negatives are stated within HPI, all other systems reviewed and are negative. Past Medical History:  has a past medical history of History of blood transfusion. Past Surgical History:  has a past surgical history that includes Upper gastrointestinal endoscopy (N/A, 2020); Upper gastrointestinal endoscopy (N/A, 2020); Foot Tendon Surgery (Right);  section; Upper gastrointestinal endoscopy (N/A, 2020); Abdomen surgery; and Wrist surgery (Right, 2021). Social History:  reports that she has been smoking cigarettes. She has a 5.00 pack-year smoking history. She has never used smokeless tobacco. She reports current alcohol use. She reports that she does not use drugs. Family History: family history includes Cancer in her father; High Blood Pressure in her mother. Allergies: Penicillin g and Pcn [penicillins]    Physical Exam:  Vital signs reviewed. Constitutional:  Alert, development consistent with age.  Well appearing and non toxic and not distressed. Ears:  TMs without perforation, injection, or bulging. External canals clear without exudate. Mouth/Throat: Airway Patent. Floor of mouth soft. no erythema or exudates noted. Teeth and gums normal..   Handling secretions, no stridor, no evidence of airway compromise, no trismus. No visible abscess or PTA. Neck:  Supple, full ROM, no asymmetry, no meningeal signs. No stridor. There is no  anterior cervical and posterior cervical node tenderness. Lungs:  Clear to auscultation and breath sounds equal.    CV: Regular rate and rhythm, normal heart sounds, without pathological murmurs, ectopy, gallops, or rubs. Abdomen:  Soft, nontender, good bowel sounds. No organomegaly,   Skin:  Warm and dry, No rashes, no erythema present. Neurological:  GCS 15, Oriented. Motor functions intact.    -------------------Nursing Notes / Prior Records & Vitals Reviewed Section----------------------   (The nursing notes within the ED encounter, home medications, current encounter or past encounter records and vital signs as below have been reviewed)   /83   Pulse 92   Temp 98.3 °F (36.8 °C) (Infrared)   Resp 18   Ht 5' 2\" (1.575 m)   Wt 160 lb (72.6 kg)   SpO2 97%   BMI 29.26 kg/m²   Oxygen Saturation Interpretation: Normal.  -------------------------------------------Test Results Section---------------------------------------------  (All laboratory and radiology results have been personally reviewed by myself)  Laboratory:  No results found for this visit on 02/08/23. Radiology: All Radiology results interpreted by Radiologist unless otherwise noted. No orders to display     -----------------------------ED Course / Medical Decision Making Section--------------------------  ED Course Medications:  Medications - No data to display    Medical Decision Making:   Sinusitis. No evidence of PTA, RP abscess, epiglottitis, ludwigs angina, or other life threatening or deep space infection or airway compromise. No meningeal signs on physical exam or concern for meningitis at this time. No chest pain or shortness of breath. Patient is nontoxic-appearing, in no acute distress, and neurovascularly intact. At this time with plan and outpatient management with azithromycin as she has been symptomatic for the past 2 weeks. No evidence of otitis media on physical exam.  Advise follow-up with PCP for recheck return emergency department with any new or worsening symptoms. Patient voiced understanding and is agreeable to the above treatment plan. Counseling: The emergency provider has spoken with the patient and discussed todays results, in addition to providing specific details for the plan of care and counseling regarding the diagnosis and prognosis. Questions are answered at this time and they are agreeable with the plan.  ------------------------------------Impression & Disposition Section------------------------------------  Impression(s):  1. Acute non-recurrent sinusitis, unspecified location        Disposition:  Disposition: Discharge to home  Patient condition is stable    New Prescriptions    AZITHROMYCIN (ZITHROMAX Z-ROSALEE) 250 MG TABLET    Take 2 tablets (500 mg) on Day 1, and then take 1 tablet (250 mg) on days 2 through 5. BENZONATATE (TESSALON) 100 MG CAPSULE    Take 1 capsule by mouth 3 times daily as needed for Cough     * NOTE: This report was transcribed using voice recognition software. Every effort was made to ensure accuracy; however, inadvertent computerized transcription errors may be present.             Annel Kirkpatrick, [place]  02/08/23 [id number]

## 2023-04-04 ENCOUNTER — TELEPHONE (OUTPATIENT)
Dept: CASE MANAGEMENT | Age: 58
End: 2023-04-04

## 2023-04-04 NOTE — TELEPHONE ENCOUNTER
I called the patient and she confirmed her CT lung screening at [facility] on 4/5/2023 at 4:00 pm.  I reminded the patient to arrive at 3:30 pm, enter through the main entrance, and register. Patient confirmed.           Electronically signed by Evaristo Lewis on 4/4/23 at 1:25 PM EDT

## 2023-04-05 ENCOUNTER — HOSPITAL ENCOUNTER (OUTPATIENT)
Dept: CT IMAGING | Age: 58
Discharge: HOME | End: 2023-04-07
Payer: COMMERCIAL

## 2023-04-05 DIAGNOSIS — F17.210 CIGARETTE SMOKER: ICD-10-CM

## 2023-04-05 DIAGNOSIS — Z87.891 PERSONAL HISTORY OF NICOTINE DEPENDENCE: ICD-10-CM

## 2023-04-05 DIAGNOSIS — Z87.891 PERSONAL HISTORY OF TOBACCO USE: ICD-10-CM

## 2023-04-05 PROCEDURE — 71271 CT THORAX LUNG CANCER SCR C-: CPT

## 2023-04-07 ENCOUNTER — TELEPHONE (OUTPATIENT)
Dept: CASE MANAGEMENT | Age: 58
End: 2023-04-07

## 2023-04-07 NOTE — TELEPHONE ENCOUNTER
No call, encounter opened to process CT Lung Screening. CT Lung Screen: 4/5/2023    Impression   1. There is no pulmonary infiltrate or suspicious pulmonary mass   2. Minimal pleural thickening versus a pleural based nodule seen within the   left lower lobe on axial image number 125. The pleural base soft tissue   density measures 0.68 cm x 0.25 cm. LUNG RADS:   Lung-RADS 2 - Benign ()       Management:  12 month screening LDCT       RECOMMENDATIONS:   If you would like to register your patient with the [facility], please contact the Nurse Navigator at   [phone]. Pack years: 5    Social History     Tobacco Use  Smoking Status: Current Every Day   Smoker    Start Date:    Quit Date:    Types: Cigarettes   Packs/Day: 0.25   Years: 20   Pack Years: 5   Smokeless Tobacco: Never         Results letter sent to patient via my chart or mailed.      [facility]

## 2023-06-30 ENCOUNTER — HOSPITAL ENCOUNTER (OUTPATIENT)
Dept: CT IMAGING | Age: 58
Discharge: HOME | End: 2023-06-30
Payer: COMMERCIAL

## 2023-06-30 DIAGNOSIS — R91.1 SOLITARY PULMONARY NODULE: ICD-10-CM

## 2023-06-30 PROCEDURE — 2580000003 HC RX 258: Performed by: RADIOLOGY

## 2023-06-30 PROCEDURE — 6360000004 HC RX CONTRAST MEDICATION: Performed by: RADIOLOGY

## 2023-06-30 PROCEDURE — 71260 CT THORAX DX C+: CPT

## 2023-06-30 RX ORDER — SODIUM CHLORIDE 0.9 % (FLUSH) 0.9 %
10 SYRINGE (ML) INJECTION PRN
Status: DISCONTINUED | OUTPATIENT
Start: 2023-06-30 | End: 2023-07-03 | Stop reason: HOSPADM

## 2023-06-30 RX ADMIN — IOPAMIDOL 50 ML: 755 INJECTION, SOLUTION INTRAVENOUS at 11:26

## 2023-06-30 RX ADMIN — SODIUM CHLORIDE, PRESERVATIVE FREE 10 ML: 5 INJECTION INTRAVENOUS at 11:26

## 2023-09-27 ENCOUNTER — HOSPITAL ENCOUNTER (EMERGENCY)
Age: 58
Discharge: HOME | End: 2023-09-27
Payer: COMMERCIAL

## 2023-09-27 VITALS
OXYGEN SATURATION: 98 % | WEIGHT: 160 LBS | HEIGHT: 62 IN | TEMPERATURE: 98.2 F | SYSTOLIC BLOOD PRESSURE: 148 MMHG | DIASTOLIC BLOOD PRESSURE: 66 MMHG | BODY MASS INDEX: 29.44 KG/M2 | RESPIRATION RATE: 18 BRPM | HEART RATE: 87 BPM

## 2023-09-27 DIAGNOSIS — J01.90 ACUTE NON-RECURRENT SINUSITIS, UNSPECIFIED LOCATION: Primary | ICD-10-CM

## 2023-09-27 PROCEDURE — 99211 OFF/OP EST MAY X REQ PHY/QHP: CPT

## 2023-09-27 RX ORDER — BROMPHENIRAMINE MALEATE, PSEUDOEPHEDRINE HYDROCHLORIDE, AND DEXTROMETHORPHAN HYDROBROMIDE 2; 30; 10 MG/5ML; MG/5ML; MG/5ML
10 SYRUP ORAL 3 TIMES DAILY PRN
Qty: 120 ML | Refills: 0 | Status: SHIPPED | OUTPATIENT
Start: 2023-09-27

## 2023-09-27 RX ORDER — PREDNISONE 10 MG/1
TABLET ORAL
Qty: 12 TABLET | Refills: 0 | Status: SHIPPED | OUTPATIENT
Start: 2023-09-27

## 2023-09-27 RX ORDER — DOXYCYCLINE HYCLATE 100 MG
100 TABLET ORAL 2 TIMES DAILY
Qty: 14 TABLET | Refills: 0 | Status: SHIPPED | OUTPATIENT
Start: 2023-09-27 | End: 2023-10-04

## 2023-09-27 ASSESSMENT — PAIN - FUNCTIONAL ASSESSMENT: PAIN_FUNCTIONAL_ASSESSMENT: 0-10

## 2023-09-27 ASSESSMENT — PAIN SCALES - GENERAL: PAINLEVEL_OUTOF10: 0

## 2024-01-02 ENCOUNTER — HOSPITAL ENCOUNTER (EMERGENCY)
Age: 59
Discharge: HOME | End: 2024-01-02
Payer: COMMERCIAL

## 2024-01-02 VITALS
DIASTOLIC BLOOD PRESSURE: 75 MMHG | HEIGHT: 62 IN | OXYGEN SATURATION: 100 % | SYSTOLIC BLOOD PRESSURE: 143 MMHG | RESPIRATION RATE: 18 BRPM | TEMPERATURE: 97.7 F | WEIGHT: 160 LBS | BODY MASS INDEX: 29.44 KG/M2 | HEART RATE: 84 BPM

## 2024-01-02 DIAGNOSIS — J32.9 SINOBRONCHITIS: Primary | ICD-10-CM

## 2024-01-02 DIAGNOSIS — J40 SINOBRONCHITIS: Primary | ICD-10-CM

## 2024-01-02 PROCEDURE — 99211 OFF/OP EST MAY X REQ PHY/QHP: CPT

## 2024-01-02 RX ORDER — BROMPHENIRAMINE MALEATE, PSEUDOEPHEDRINE HYDROCHLORIDE, AND DEXTROMETHORPHAN HYDROBROMIDE 2; 30; 10 MG/5ML; MG/5ML; MG/5ML
10 SYRUP ORAL 3 TIMES DAILY PRN
Qty: 120 ML | Refills: 0 | Status: SHIPPED | OUTPATIENT
Start: 2024-01-02

## 2024-01-02 RX ORDER — PREDNISONE 10 MG/1
TABLET ORAL
Qty: 12 TABLET | Refills: 0 | Status: SHIPPED | OUTPATIENT
Start: 2024-01-02

## 2024-01-02 RX ORDER — DOXYCYCLINE HYCLATE 100 MG
100 TABLET ORAL 2 TIMES DAILY
Qty: 14 TABLET | Refills: 0 | Status: SHIPPED | OUTPATIENT
Start: 2024-01-02 | End: 2024-01-09

## 2024-01-02 ASSESSMENT — PAIN - FUNCTIONAL ASSESSMENT: PAIN_FUNCTIONAL_ASSESSMENT: NONE - DENIES PAIN

## 2024-01-02 NOTE — ED PROVIDER NOTES
by mouth 3 times daily as needed for Congestion or Cough    PREDNISONE (DELTASONE) 10 MG TABLET    Take 30mg by mouth daily x 2 days, then 20mg by mouth daily x 2 days then 10mg by mouth daily x 2 days.       PATIENT REFERRED TO:  Jose Cruz Crowder Jr., DO  [address] [place] [id number]  [phone]    Schedule an appointment as soon as possible for a visit         --------------------------------- ADDITIONAL PROVIDER NOTES ---------------------------------  I have spoken with the patient and discussed today’s results, in addition to providing specific details for the plan of care and counseling regarding the diagnosis and prognosis.  Their questions are answered at this time and they are agreeable with the plan.   This patient is stable for discharge.  I have shared the specific conditions for return, as well as the importance of follow-up.      * NOTE: (Please note that portions of this note were completed with a voice recognition program.  Efforts were made to edit the dictations but occasionally words are mis-transcribed.)    --------------------------------- IMPRESSION AND DISPOSITION ---------------------------------    IMPRESSION  1. Sinobronchitis        DISPOSITION Discharge - Pending Orders Complete 01/02/2024 03:13:48 PM    Disposition: Discharge to home  Patient condition is good    I am the Primary Clinician of Record.     Ramakrishna Calderon PA-C (electronically signed) on 1/2/2024 at 3:16 PM         Ramakrishna Calderon PA-C  01/02/24 1517       Ramakrishna Calderon PA-C  01/02/24 1530

## 2024-01-10 ENCOUNTER — HOSPITAL ENCOUNTER (OUTPATIENT)
Dept: CT IMAGING | Age: 59
Discharge: HOME | End: 2024-01-12
Payer: COMMERCIAL

## 2024-01-10 DIAGNOSIS — R91.8 OTHER NONSPECIFIC ABNORMAL FINDING OF LUNG FIELD: ICD-10-CM

## 2024-01-10 PROCEDURE — 71250 CT THORAX DX C-: CPT

## 2024-01-19 ENCOUNTER — OFFICE VISIT (OUTPATIENT)
Dept: CARDIOLOGY CLINIC | Age: 59
End: 2024-01-19
Payer: COMMERCIAL

## 2024-01-19 VITALS
OXYGEN SATURATION: 98 % | HEIGHT: 62 IN | WEIGHT: 158.6 LBS | BODY MASS INDEX: 29.19 KG/M2 | DIASTOLIC BLOOD PRESSURE: 64 MMHG | SYSTOLIC BLOOD PRESSURE: 124 MMHG | RESPIRATION RATE: 18 BRPM | HEART RATE: 77 BPM

## 2024-01-19 DIAGNOSIS — R55 VASODEPRESSOR SYNCOPE: Primary | ICD-10-CM

## 2024-01-19 DIAGNOSIS — E78.00 PURE HYPERCHOLESTEROLEMIA: ICD-10-CM

## 2024-01-19 DIAGNOSIS — Z01.810 PREOPERATIVE CARDIOVASCULAR EXAMINATION: ICD-10-CM

## 2024-01-19 DIAGNOSIS — J44.9 CHRONIC OBSTRUCTIVE PULMONARY DISEASE, UNSPECIFIED COPD TYPE (HCC): ICD-10-CM

## 2024-01-19 PROCEDURE — 3017F COLORECTAL CA SCREEN DOC REV: CPT | Performed by: INTERNAL MEDICINE

## 2024-01-19 PROCEDURE — G8419 CALC BMI OUT NRM PARAM NOF/U: HCPCS | Performed by: INTERNAL MEDICINE

## 2024-01-19 PROCEDURE — G8427 DOCREV CUR MEDS BY ELIG CLIN: HCPCS | Performed by: INTERNAL MEDICINE

## 2024-01-19 PROCEDURE — 93000 ELECTROCARDIOGRAM COMPLETE: CPT | Performed by: INTERNAL MEDICINE

## 2024-01-19 PROCEDURE — G8484 FLU IMMUNIZE NO ADMIN: HCPCS | Performed by: INTERNAL MEDICINE

## 2024-01-19 PROCEDURE — 3023F SPIROM DOC REV: CPT | Performed by: INTERNAL MEDICINE

## 2024-01-19 PROCEDURE — 4004F PT TOBACCO SCREEN RCVD TLK: CPT | Performed by: INTERNAL MEDICINE

## 2024-01-19 PROCEDURE — 99215 OFFICE O/P EST HI 40 MIN: CPT | Performed by: INTERNAL MEDICINE

## 2024-01-19 RX ORDER — ROSUVASTATIN CALCIUM 20 MG/1
20 TABLET, COATED ORAL DAILY
COMMUNITY
Start: 2024-01-04

## 2024-01-19 NOTE — PROGRESS NOTES
performance in addition to reducing risk of the development of obstructive sleep apnea with associated adverse cardiovascular effects.  Ongoing aggressive risk factor modification of blood pressure and serum lipids will remain essential to reducing risk of future atherosclerotic development.  I will present return her to your care with the above-referenced recommendations and would happily reassess her in the future should additional cardiovascular difficulties or concerns arise.    The patient's current medication list, allergies, problem list and results of all previously ordered testing were reviewed at today's visit.    Follow-up office visit as needed should additional cardiovascular difficulties or concerns arise      Note: This report was completed using computerized voice recognition software. Every effort has been made to ensure accuracy, however; inadvertent computerized transcription errors may be present.    Lewis Colin MD  [facility] Cardiology    An electronic copy of this follow-up progress note was forwarded to Dr. Walter De La Fuente

## 2024-02-06 ENCOUNTER — HOSPITAL ENCOUNTER (OUTPATIENT)
Age: 59
Discharge: HOME | End: 2024-02-08

## 2024-02-06 PROCEDURE — 87081 CULTURE SCREEN ONLY: CPT

## 2024-02-08 LAB
MICROORGANISM SPEC CULT: NORMAL
SPECIMEN DESCRIPTION: NORMAL

## 2024-07-01 ENCOUNTER — HOSPITAL ENCOUNTER (OUTPATIENT)
Dept: CT IMAGING | Age: 59
Discharge: HOME | End: 2024-07-03
Payer: COMMERCIAL

## 2024-07-01 DIAGNOSIS — R91.1 SOLITARY PULMONARY NODULE: ICD-10-CM

## 2024-07-01 PROCEDURE — 71250 CT THORAX DX C-: CPT

## 2024-08-26 ENCOUNTER — HOSPITAL ENCOUNTER (EMERGENCY)
Age: 59
Discharge: HOME | End: 2024-08-26
Payer: COMMERCIAL

## 2024-08-26 VITALS
DIASTOLIC BLOOD PRESSURE: 69 MMHG | OXYGEN SATURATION: 97 % | WEIGHT: 160 LBS | RESPIRATION RATE: 18 BRPM | TEMPERATURE: 97.8 F | BODY MASS INDEX: 29.26 KG/M2 | HEART RATE: 74 BPM | SYSTOLIC BLOOD PRESSURE: 149 MMHG

## 2024-08-26 DIAGNOSIS — U07.1 COVID-19 VIRUS INFECTION: Primary | ICD-10-CM

## 2024-08-26 LAB
SARS-COV-2 RDRP RESP QL NAA+PROBE: DETECTED
SPECIMEN DESCRIPTION: ABNORMAL

## 2024-08-26 PROCEDURE — 99211 OFF/OP EST MAY X REQ PHY/QHP: CPT

## 2024-08-26 PROCEDURE — 87635 SARS-COV-2 COVID-19 AMP PRB: CPT

## 2024-08-26 RX ORDER — FLUTICASONE FUROATE, UMECLIDINIUM BROMIDE AND VILANTEROL TRIFENATATE 100; 62.5; 25 UG/1; UG/1; UG/1
1 POWDER RESPIRATORY (INHALATION) DAILY
COMMUNITY

## 2024-08-26 RX ORDER — BENZONATATE 100 MG/1
100 CAPSULE ORAL 3 TIMES DAILY PRN
Qty: 21 CAPSULE | Refills: 0 | Status: SHIPPED | OUTPATIENT
Start: 2024-08-26 | End: 2024-09-02

## 2024-08-26 RX ORDER — FERROUS SULFATE 325(65) MG
325 TABLET ORAL
COMMUNITY

## 2024-08-26 RX ORDER — CETIRIZINE HYDROCHLORIDE, PSEUDOEPHEDRINE HYDROCHLORIDE 5; 120 MG/1; MG/1
1 TABLET, FILM COATED, EXTENDED RELEASE ORAL 2 TIMES DAILY
Qty: 60 TABLET | Refills: 0 | Status: SHIPPED | OUTPATIENT
Start: 2024-08-26 | End: 2024-09-25

## 2024-08-26 RX ORDER — FLUTICASONE PROPIONATE 50 MCG
2 SPRAY, SUSPENSION (ML) NASAL DAILY
Qty: 16 G | Refills: 0 | Status: SHIPPED | OUTPATIENT
Start: 2024-08-26

## 2024-08-26 ASSESSMENT — PAIN DESCRIPTION - LOCATION: LOCATION: FACE

## 2024-08-26 ASSESSMENT — PAIN DESCRIPTION - DESCRIPTORS: DESCRIPTORS: PRESSURE

## 2024-08-26 ASSESSMENT — PAIN SCALES - GENERAL: PAINLEVEL_OUTOF10: 8

## 2024-08-26 ASSESSMENT — PAIN - FUNCTIONAL ASSESSMENT: PAIN_FUNCTIONAL_ASSESSMENT: 0-10

## 2024-08-26 NOTE — DISCHARGE INSTRUCTIONS
You have tested positive for COVID.  You will need to remain isolated until your symptoms are improving or if you are fever free for 24 hours without any Tylenol or ibuprofen.  Flonase nasal spray 2 sprays each nostril daily, Zyrtec-D.  This unfortunately will have to run its course.

## 2024-08-26 NOTE — ED PROVIDER NOTES
[facility]  Department of Emergency Medicine     Encounter Note  Admit Date/RoomTime: 2024  5:01 PM   Room:     NAME: Patricia Fung  : 1965  MRN: [id number]     Chief Complaint:  Sinusitis (Pt states she has sinus pain and pressure, nasal congestion, b/l ear fulness, onset 2 days ago. Muxinex D/Tylenol last 0600)    History of Present Illness       Patricia Fung is a 59 y.o. old female who presents to the urgent care with a 2-day history of sinus pain, sinus pressure, nasal congestion.  Was exposed to COVID-19 at work.  Mild cough.  Took some Mucinex D and Tylenol last night but nothing today.  No fevers, chills, chest pain, shortness of breath.  ROS   Pertinent positives and negatives are stated within HPI, all other systems reviewed and are negative.    Past Medical History:  has a past medical history of History of blood transfusion.    Surgical History:  has a past surgical history that includes Upper gastrointestinal endoscopy (N/A, 2020); Upper gastrointestinal endoscopy (N/A, 2020); Foot Tendon Surgery (Right);  section; Upper gastrointestinal endoscopy (N/A, 2020); Abdomen surgery; and Wrist surgery (Right, 2021).    Social History:  reports that she has been smoking cigarettes. She has a 10 pack-year smoking history. She has never used smokeless tobacco. She reports current alcohol use. She reports that she does not use drugs.    Family History: family history includes Cancer in her father; High Blood Pressure in her mother.     Allergies: Penicillin g and Pcn [penicillins]    Physical Exam   Oxygen Saturation Interpretation: Normal on room air analysis.        ED Triage Vitals   BP Systolic BP Percentile Diastolic BP Percentile Temp Temp src Pulse Respirations SpO2   24 1703 -- -- 24 1703 -- 24 1703 24 1703 24 1703   (!) 149/69   97.8 °F (36.6 °C)  74 18 97 %      Height Weight - Scale         -- 24 170           72.6 kg (160 lb)               Constitutional:  Alert, development consistent with age.  Ears:  External Ears: Bilateral normal.               TM's & External Canals: normal TM's and external ear canals bilaterally.  Nose:   There is clear rhinorrhea, mucosal erythema, and mucosal edema.  Sinuses: no Bilateral maxillary sinus tenderness.                    no Bilateral frontal sinus tenderness.  Mouth:  normal tongue and buccal mucosa.   Throat: no erythema or exudates noted. Teeth and gums normal..  Airway Patent.  Neck:  Supple. No meningeal signs. There is no  preauricular and anterior cervical node tenderness.  Respiratory:   Breath sounds: Bilateral normal.  Lung sounds: normal.   CV:  Regular rate and rhythm, normal heart sounds, without pathological murmurs, ectopy, gallops, or rubs.  GI:  Abdomen Soft, nontender, good bowel sounds.  No firm or pulsatile mass.  Integument:  Normal turgor.  Warm, dry, without visible rash.  Neurological:  Oriented.  Motor functions intact.       Lab / Imaging Results   (All laboratory and radiology results have been personally reviewed by myself)  Labs:  Results for orders placed or performed during the hospital encounter of 08/26/24   COVID-19, Rapid    Specimen: Nasopharyngeal Swab   Result Value Ref Range    Specimen Description .NASOPHARYNGEAL SWAB     SARS-CoV-2, Rapid DETECTED (A) Not Detected       Imaging:  All Radiology results interpreted by Radiologist unless otherwise noted.  No orders to display       ED Course / Medical Decision Making   Medications - No data to display       Consults:   None    Procedures:   none    Medical Decision Making: Please refer to HPI as above, on exam patient is well-appearing, well-hydrated, O2 sat is 97% on room air, she is in no respiratory distress.  No obvious bacterial upper respiratory infection noted.  COVID testing was positive.  Advised patient this is a viral self-limiting illness.  Advised her of current CDC

## 2024-08-28 ENCOUNTER — TELEPHONE (OUTPATIENT)
Dept: ADMINISTRATIVE | Age: 59
End: 2024-08-28

## 2024-08-28 NOTE — TELEPHONE ENCOUNTER
Patient is having surgery on 10/1 for a lower back fusion by Dr. De La Fuente at [facility]. He is requesting cardiac clearance. Does she need to come in for it? Please advise.

## 2024-09-17 ENCOUNTER — OFFICE VISIT (OUTPATIENT)
Dept: CARDIOLOGY CLINIC | Age: 59
End: 2024-09-17
Payer: COMMERCIAL

## 2024-09-17 VITALS
HEIGHT: 62 IN | HEART RATE: 74 BPM | SYSTOLIC BLOOD PRESSURE: 152 MMHG | WEIGHT: 163 LBS | RESPIRATION RATE: 18 BRPM | BODY MASS INDEX: 30 KG/M2 | DIASTOLIC BLOOD PRESSURE: 60 MMHG

## 2024-09-17 DIAGNOSIS — Z01.810 PREOP CARDIOVASCULAR EXAM: Primary | ICD-10-CM

## 2024-09-17 DIAGNOSIS — E78.5 HYPERLIPIDEMIA, UNSPECIFIED HYPERLIPIDEMIA TYPE: ICD-10-CM

## 2024-09-17 DIAGNOSIS — R06.09 DOE (DYSPNEA ON EXERTION): ICD-10-CM

## 2024-09-17 PROCEDURE — G8427 DOCREV CUR MEDS BY ELIG CLIN: HCPCS

## 2024-09-17 PROCEDURE — 3017F COLORECTAL CA SCREEN DOC REV: CPT

## 2024-09-17 PROCEDURE — 99214 OFFICE O/P EST MOD 30 MIN: CPT

## 2024-09-17 PROCEDURE — 93000 ELECTROCARDIOGRAM COMPLETE: CPT | Performed by: INTERNAL MEDICINE

## 2024-09-17 PROCEDURE — G8419 CALC BMI OUT NRM PARAM NOF/U: HCPCS

## 2024-09-17 PROCEDURE — 4004F PT TOBACCO SCREEN RCVD TLK: CPT

## 2024-09-17 RX ORDER — OMEGA-3-ACID ETHYL ESTERS 1 G/1
2 CAPSULE, LIQUID FILLED ORAL 2 TIMES DAILY
COMMUNITY
Start: 2024-08-30

## 2024-09-23 ENCOUNTER — HOSPITAL ENCOUNTER (OUTPATIENT)
Age: 59
Discharge: HOME | End: 2024-09-25

## 2024-09-23 LAB
ABO + RH BLD: NORMAL
ARM BAND NUMBER: NORMAL
BASOPHILS # BLD: 0.06 K/UL (ref 0–0.2)
BASOPHILS NFR BLD: 1 % (ref 0–2)
BLOOD BANK SAMPLE EXPIRATION: NORMAL
BLOOD GROUP ANTIBODIES SERPL: NEGATIVE
EOSINOPHIL # BLD: 0.1 K/UL (ref 0.05–0.5)
EOSINOPHILS RELATIVE PERCENT: 1 % (ref 0–6)
ERYTHROCYTE [DISTWIDTH] IN BLOOD BY AUTOMATED COUNT: 13.2 % (ref 11.5–15)
HCT VFR BLD AUTO: 39.3 % (ref 34–48)
HGB BLD-MCNC: 13.2 G/DL (ref 11.5–15.5)
IMM GRANULOCYTES # BLD AUTO: 0.05 K/UL (ref 0–0.58)
IMM GRANULOCYTES NFR BLD: 1 % (ref 0–5)
INR PPP: 1
LYMPHOCYTES NFR BLD: 1.41 K/UL (ref 1.5–4)
LYMPHOCYTES RELATIVE PERCENT: 20 % (ref 20–42)
MCH RBC QN AUTO: 30 PG (ref 26–35)
MCHC RBC AUTO-ENTMCNC: 33.6 G/DL (ref 32–34.5)
MCV RBC AUTO: 89.3 FL (ref 80–99.9)
MONOCYTES NFR BLD: 0.61 K/UL (ref 0.1–0.95)
MONOCYTES NFR BLD: 8 % (ref 2–12)
NEUTROPHILS NFR BLD: 69 % (ref 43–80)
NEUTS SEG NFR BLD: 5 K/UL (ref 1.8–7.3)
PARTIAL THROMBOPLASTIN TIME: 29.3 SEC (ref 24.5–35.1)
PLATELET # BLD AUTO: 330 K/UL (ref 130–450)
PMV BLD AUTO: 9.6 FL (ref 7–12)
PROTHROMBIN TIME: 10.5 SEC (ref 9.3–12.4)
RBC # BLD AUTO: 4.4 M/UL (ref 3.5–5.5)
WBC OTHER # BLD: 7.2 K/UL (ref 4.5–11.5)

## 2024-09-23 PROCEDURE — 87081 CULTURE SCREEN ONLY: CPT

## 2024-09-23 PROCEDURE — 86850 RBC ANTIBODY SCREEN: CPT

## 2024-09-23 PROCEDURE — 86900 BLOOD TYPING SEROLOGIC ABO: CPT

## 2024-09-23 PROCEDURE — 86901 BLOOD TYPING SEROLOGIC RH(D): CPT

## 2024-09-23 PROCEDURE — 85610 PROTHROMBIN TIME: CPT

## 2024-09-23 PROCEDURE — 85730 THROMBOPLASTIN TIME PARTIAL: CPT

## 2024-09-23 PROCEDURE — 85025 COMPLETE CBC W/AUTO DIFF WBC: CPT

## 2024-09-23 PROCEDURE — G0480 DRUG TEST DEF 1-7 CLASSES: HCPCS

## 2024-09-25 LAB
MICROORGANISM SPEC CULT: NORMAL
SPECIMEN DESCRIPTION: NORMAL

## 2024-09-28 LAB
COTININE: <5 NG/ML
NICOTINE: <5 NG/ML

## 2024-10-02 ENCOUNTER — HOSPITAL ENCOUNTER (OUTPATIENT)
Age: 59
Discharge: HOME | End: 2024-10-04

## 2024-10-02 LAB
ALBUMIN SERPL-MCNC: 4 G/DL (ref 3.5–5.2)
ALP SERPL-CCNC: 77 U/L (ref 35–104)
ALT SERPL-CCNC: 24 U/L (ref 0–32)
ANION GAP SERPL CALCULATED.3IONS-SCNC: 10 MMOL/L (ref 7–16)
AST SERPL-CCNC: 29 U/L (ref 0–31)
BILIRUB SERPL-MCNC: 0.2 MG/DL (ref 0–1.2)
BUN SERPL-MCNC: 13 MG/DL (ref 6–23)
CALCIUM SERPL-MCNC: 8.5 MG/DL (ref 8.6–10.2)
CHLORIDE SERPL-SCNC: 103 MMOL/L (ref 98–107)
CO2 SERPL-SCNC: 25 MMOL/L (ref 22–29)
CREAT SERPL-MCNC: 0.6 MG/DL (ref 0.5–1)
ERYTHROCYTE [DISTWIDTH] IN BLOOD BY AUTOMATED COUNT: 13.2 % (ref 11.5–15)
GFR, ESTIMATED: >90 ML/MIN/1.73M2
GLUCOSE SERPL-MCNC: 146 MG/DL (ref 74–99)
HCT VFR BLD AUTO: 37.8 % (ref 34–48)
HGB BLD-MCNC: 12.6 G/DL (ref 11.5–15.5)
MCH RBC QN AUTO: 30.4 PG (ref 26–35)
MCHC RBC AUTO-ENTMCNC: 33.3 G/DL (ref 32–34.5)
MCV RBC AUTO: 91.3 FL (ref 80–99.9)
PLATELET # BLD AUTO: 313 K/UL (ref 130–450)
PMV BLD AUTO: 9.8 FL (ref 7–12)
POTASSIUM SERPL-SCNC: 4.2 MMOL/L (ref 3.5–5)
PROT SERPL-MCNC: 6.5 G/DL (ref 6.4–8.3)
RBC # BLD AUTO: 4.14 M/UL (ref 3.5–5.5)
SODIUM SERPL-SCNC: 138 MMOL/L (ref 132–146)
WBC OTHER # BLD: 12.7 K/UL (ref 4.5–11.5)

## 2024-10-02 PROCEDURE — 85027 COMPLETE CBC AUTOMATED: CPT

## 2024-10-02 PROCEDURE — 80053 COMPREHEN METABOLIC PANEL: CPT

## 2024-10-03 ENCOUNTER — HOSPITAL ENCOUNTER (OUTPATIENT)
Age: 59
Discharge: HOME | End: 2024-10-05

## 2024-10-03 LAB
ALBUMIN SERPL-MCNC: 3.9 G/DL (ref 3.5–5.2)
ALP SERPL-CCNC: 78 U/L (ref 35–104)
ALT SERPL-CCNC: 26 U/L (ref 0–32)
ANION GAP SERPL CALCULATED.3IONS-SCNC: 8 MMOL/L (ref 7–16)
AST SERPL-CCNC: 33 U/L (ref 0–31)
BILIRUB SERPL-MCNC: <0.2 MG/DL (ref 0–1.2)
BUN SERPL-MCNC: 13 MG/DL (ref 6–23)
CALCIUM SERPL-MCNC: 8.6 MG/DL (ref 8.6–10.2)
CHLORIDE SERPL-SCNC: 104 MMOL/L (ref 98–107)
CO2 SERPL-SCNC: 27 MMOL/L (ref 22–29)
CREAT SERPL-MCNC: 0.6 MG/DL (ref 0.5–1)
ERYTHROCYTE [DISTWIDTH] IN BLOOD BY AUTOMATED COUNT: 13.3 % (ref 11.5–15)
GFR, ESTIMATED: >90 ML/MIN/1.73M2
GLUCOSE SERPL-MCNC: 135 MG/DL (ref 74–99)
HCT VFR BLD AUTO: 35.9 % (ref 34–48)
HGB BLD-MCNC: 11.5 G/DL (ref 11.5–15.5)
MCH RBC QN AUTO: 29.9 PG (ref 26–35)
MCHC RBC AUTO-ENTMCNC: 32 G/DL (ref 32–34.5)
MCV RBC AUTO: 93.2 FL (ref 80–99.9)
PLATELET # BLD AUTO: 262 K/UL (ref 130–450)
PMV BLD AUTO: 10 FL (ref 7–12)
POTASSIUM SERPL-SCNC: 4.2 MMOL/L (ref 3.5–5)
PROT SERPL-MCNC: 6.2 G/DL (ref 6.4–8.3)
RBC # BLD AUTO: 3.85 M/UL (ref 3.5–5.5)
SODIUM SERPL-SCNC: 139 MMOL/L (ref 132–146)
WBC OTHER # BLD: 10.1 K/UL (ref 4.5–11.5)

## 2024-10-03 PROCEDURE — 80053 COMPREHEN METABOLIC PANEL: CPT

## 2024-10-03 PROCEDURE — 85027 COMPLETE CBC AUTOMATED: CPT

## 2025-05-07 ENCOUNTER — TRANSCRIBE ORDERS (OUTPATIENT)
Dept: ADMINISTRATIVE | Age: 60
End: 2025-05-07

## 2025-05-07 DIAGNOSIS — J42 CHRONIC BRONCHITIS, UNSPECIFIED CHRONIC BRONCHITIS TYPE (HCC): Primary | ICD-10-CM

## 2025-06-09 ENCOUNTER — TRANSCRIBE ORDERS (OUTPATIENT)
Dept: ADMINISTRATIVE | Age: 60
End: 2025-06-09

## 2025-06-09 DIAGNOSIS — R91.1 LUNG NODULE: Primary | ICD-10-CM

## 2025-06-09 DIAGNOSIS — J44.9 CHRONIC OBSTRUCTIVE PULMONARY DISEASE, UNSPECIFIED COPD TYPE (HCC): ICD-10-CM

## 2025-07-14 ENCOUNTER — HOSPITAL ENCOUNTER (OUTPATIENT)
Dept: PULMONOLOGY | Age: 60
Discharge: HOME | End: 2025-07-14
Payer: COMMERCIAL

## 2025-07-14 ENCOUNTER — HOSPITAL ENCOUNTER (OUTPATIENT)
Dept: CT IMAGING | Age: 60
Discharge: HOME | End: 2025-07-16
Payer: COMMERCIAL

## 2025-07-14 DIAGNOSIS — R91.1 LUNG NODULE: ICD-10-CM

## 2025-07-14 DIAGNOSIS — J44.9 CHRONIC OBSTRUCTIVE PULMONARY DISEASE, UNSPECIFIED COPD TYPE (HCC): ICD-10-CM

## 2025-07-14 PROCEDURE — 71250 CT THORAX DX C-: CPT

## 2025-07-14 PROCEDURE — 94729 DIFFUSING CAPACITY: CPT

## 2025-07-14 PROCEDURE — 94726 PLETHYSMOGRAPHY LUNG VOLUMES: CPT

## 2025-07-14 PROCEDURE — 94060 EVALUATION OF WHEEZING: CPT

## 2025-07-14 NOTE — PROCEDURES
[facility]              [address] [place] [address]                           PULMONARY FUNCTION      PATIENT NAME: CLIFFORD NAVA               : 1965  MED REC NO: [id number]                        ROOM:   ACCOUNT NO: [id number]                       ADMIT DATE: 2025  PROVIDER: Brooke Caro MD      DATE OF PROCEDURE: 2025    SURGEON:  Brooke Caro MD    REFERRING PHYSICIAN:  CALLIE ZIMMERMAN    DIAGNOSIS:  J42, unspecified chronic bronchitis.      This is a full PFT.  The spirometry was of good quality.  It revealed normal FVC, FEV1, and FEV1/FVC ratio without significant bronchodilator response.  The best FEV1 was the post-bronchodilator value of 2.21 L, which is 95% of predicted.    Lung volumes revealed normal total lung capacity, residual volume and thoracic gas volume.    Diffusion capacity was normal.    Airway resistance was normal.    IMPRESSION:  Normal PFT.          BROOKE CARO MD      D:  2025 14:21:01     T:  2025 14:26:15     GRACIE/RENATE  Job #:  [id number]     Doc#:  [id number]

## 2025-08-22 ENCOUNTER — HOSPITAL ENCOUNTER (OUTPATIENT)
Dept: PULMONOLOGY | Age: 60
Discharge: HOME | End: 2025-08-22
Payer: COMMERCIAL

## 2025-08-22 DIAGNOSIS — J42 CHRONIC BRONCHITIS, UNSPECIFIED CHRONIC BRONCHITIS TYPE (HCC): ICD-10-CM

## 2025-08-22 PROCEDURE — 94060 EVALUATION OF WHEEZING: CPT

## 2025-08-22 PROCEDURE — 94726 PLETHYSMOGRAPHY LUNG VOLUMES: CPT

## 2025-08-22 PROCEDURE — 94729 DIFFUSING CAPACITY: CPT

## (undated) DEVICE — DEFENDO AIR WATER SUCTION AND BIOPSY VALVE KIT FOR  OLYMPUS: Brand: DEFENDO AIR/WATER/SUCTION AND BIOPSY VALVE

## (undated) DEVICE — BLOCK BITE 60FR RUBBER ADLT DENTAL

## (undated) DEVICE — GAUZE,SPONGE,POST-OP,4X3,STRL,LF: Brand: MEDLINE

## (undated) DEVICE — CANNULA NSL ORAL AD FOR CAPNOFLEX CO2 O2 AIRLFE

## (undated) DEVICE — CONNECTOR TBNG AUX H2O JET DISP FOR OLY 160/180 SER

## (undated) DEVICE — VALVE SUCTION AIR H2O SET ORCA POD + DISP

## (undated) DEVICE — WORKING LENGTH 155CM, WORKING CHANNEL 2.8MM: Brand: RESOLUTION 360 CLIP

## (undated) DEVICE — APPLIER CLP L6IN 28MM WRK CHN 11MM OPN TISS HEMSTAT REUSE

## (undated) DEVICE — BITEBLOCK 54FR W/ DENT RIM BLOX

## (undated) DEVICE — CATHETER SCLERO L240CM NDL 25GA L4MM SHTH DIA2.3MM CNTRST